# Patient Record
Sex: FEMALE | Race: WHITE | NOT HISPANIC OR LATINO | Employment: OTHER | URBAN - METROPOLITAN AREA
[De-identification: names, ages, dates, MRNs, and addresses within clinical notes are randomized per-mention and may not be internally consistent; named-entity substitution may affect disease eponyms.]

---

## 2017-04-21 ENCOUNTER — OFFICE VISIT - HEALTHEAST (OUTPATIENT)
Dept: FAMILY MEDICINE | Facility: CLINIC | Age: 62
End: 2017-04-21

## 2017-04-21 DIAGNOSIS — F51.01 PRIMARY INSOMNIA: ICD-10-CM

## 2017-04-21 DIAGNOSIS — E78.5 HYPERLIPIDEMIA, UNSPECIFIED HYPERLIPIDEMIA TYPE: ICD-10-CM

## 2017-04-21 DIAGNOSIS — E03.9 HYPOTHYROIDISM, UNSPECIFIED TYPE: ICD-10-CM

## 2017-04-21 DIAGNOSIS — A60.04 HERPES SIMPLEX VULVOVAGINITIS: ICD-10-CM

## 2017-04-21 DIAGNOSIS — Z00.00 ROUTINE GENERAL MEDICAL EXAMINATION AT A HEALTH CARE FACILITY: ICD-10-CM

## 2017-04-21 DIAGNOSIS — M94.9 DISORDER OF BONE AND CARTILAGE: ICD-10-CM

## 2017-04-21 DIAGNOSIS — Z12.11 SCREENING FOR COLON CANCER: ICD-10-CM

## 2017-04-21 DIAGNOSIS — Z12.31 SCREENING MAMMOGRAM, ENCOUNTER FOR: ICD-10-CM

## 2017-04-21 DIAGNOSIS — M89.9 DISORDER OF BONE AND CARTILAGE: ICD-10-CM

## 2017-04-21 LAB
CHOLEST SERPL-MCNC: 266 MG/DL
FASTING STATUS PATIENT QL REPORTED: YES
HDLC SERPL-MCNC: 71 MG/DL
LDLC SERPL CALC-MCNC: 177 MG/DL
TRIGL SERPL-MCNC: 91 MG/DL

## 2017-04-21 ASSESSMENT — MIFFLIN-ST. JEOR: SCORE: 1003.76

## 2017-04-22 ENCOUNTER — COMMUNICATION - HEALTHEAST (OUTPATIENT)
Dept: FAMILY MEDICINE | Facility: CLINIC | Age: 62
End: 2017-04-22

## 2017-04-26 ENCOUNTER — COMMUNICATION - HEALTHEAST (OUTPATIENT)
Dept: FAMILY MEDICINE | Facility: CLINIC | Age: 62
End: 2017-04-26

## 2017-05-15 ENCOUNTER — AMBULATORY - HEALTHEAST (OUTPATIENT)
Dept: LAB | Facility: CLINIC | Age: 62
End: 2017-05-15

## 2017-05-15 DIAGNOSIS — Z12.11 COLON CANCER SCREENING: ICD-10-CM

## 2017-05-24 ENCOUNTER — HOSPITAL ENCOUNTER (OUTPATIENT)
Dept: MAMMOGRAPHY | Facility: HOSPITAL | Age: 62
Discharge: HOME | End: 2017-05-24
Attending: FAMILY MEDICINE

## 2017-05-24 DIAGNOSIS — Z00.00 ROUTINE GENERAL MEDICAL EXAMINATION AT A HEALTH CARE FACILITY: ICD-10-CM

## 2017-05-24 DIAGNOSIS — Z12.31 SCREENING MAMMOGRAM, ENCOUNTER FOR: ICD-10-CM

## 2017-06-01 ENCOUNTER — AMBULATORY - HEALTHEAST (OUTPATIENT)
Dept: MAMMOGRAPHY | Facility: HOSPITAL | Age: 62
End: 2017-06-01

## 2017-06-24 ENCOUNTER — HEALTH MAINTENANCE LETTER (OUTPATIENT)
Age: 62
End: 2017-06-24

## 2017-07-28 ENCOUNTER — COMMUNICATION - HEALTHEAST (OUTPATIENT)
Dept: FAMILY MEDICINE | Facility: CLINIC | Age: 62
End: 2017-07-28

## 2017-08-21 ENCOUNTER — COMMUNICATION - HEALTHEAST (OUTPATIENT)
Dept: FAMILY MEDICINE | Facility: CLINIC | Age: 62
End: 2017-08-21

## 2017-08-21 ENCOUNTER — AMBULATORY - HEALTHEAST (OUTPATIENT)
Dept: LAB | Facility: CLINIC | Age: 62
End: 2017-08-21

## 2017-08-21 DIAGNOSIS — R53.83 FATIGUE, UNSPECIFIED TYPE: ICD-10-CM

## 2017-08-21 DIAGNOSIS — E03.9 HYPOTHYROIDISM, UNSPECIFIED TYPE: ICD-10-CM

## 2017-10-11 ENCOUNTER — AMBULATORY - HEALTHEAST (OUTPATIENT)
Dept: NURSING | Facility: CLINIC | Age: 62
End: 2017-10-11

## 2017-10-11 DIAGNOSIS — Z23 NEED FOR VACCINATION: ICD-10-CM

## 2017-12-16 ENCOUNTER — RECORDS - HEALTHEAST (OUTPATIENT)
Dept: ADMINISTRATIVE | Facility: OTHER | Age: 62
End: 2017-12-16

## 2017-12-17 ENCOUNTER — RECORDS - HEALTHEAST (OUTPATIENT)
Dept: ADMINISTRATIVE | Facility: OTHER | Age: 62
End: 2017-12-17

## 2017-12-18 ENCOUNTER — COMMUNICATION - HEALTHEAST (OUTPATIENT)
Dept: FAMILY MEDICINE | Facility: CLINIC | Age: 62
End: 2017-12-18

## 2017-12-20 ENCOUNTER — OFFICE VISIT - HEALTHEAST (OUTPATIENT)
Dept: FAMILY MEDICINE | Facility: CLINIC | Age: 62
End: 2017-12-20

## 2017-12-20 DIAGNOSIS — R00.2 PALPITATIONS: ICD-10-CM

## 2017-12-20 DIAGNOSIS — R55 SYNCOPE AND COLLAPSE: ICD-10-CM

## 2017-12-20 DIAGNOSIS — E87.6 HYPOKALEMIA: ICD-10-CM

## 2017-12-20 ASSESSMENT — MIFFLIN-ST. JEOR: SCORE: 1009.65

## 2017-12-21 ENCOUNTER — COMMUNICATION - HEALTHEAST (OUTPATIENT)
Dept: FAMILY MEDICINE | Facility: CLINIC | Age: 62
End: 2017-12-21

## 2017-12-21 LAB
ATRIAL RATE - MUSE: 61 BPM
DIASTOLIC BLOOD PRESSURE - MUSE: NORMAL MMHG
INTERPRETATION ECG - MUSE: NORMAL
P AXIS - MUSE: 74 DEGREES
PR INTERVAL - MUSE: 182 MS
QRS DURATION - MUSE: 86 MS
QT - MUSE: 422 MS
QTC - MUSE: 424 MS
R AXIS - MUSE: 59 DEGREES
SYSTOLIC BLOOD PRESSURE - MUSE: NORMAL MMHG
T AXIS - MUSE: 41 DEGREES
VENTRICULAR RATE- MUSE: 61 BPM

## 2017-12-26 ENCOUNTER — COMMUNICATION - HEALTHEAST (OUTPATIENT)
Dept: FAMILY MEDICINE | Facility: CLINIC | Age: 62
End: 2017-12-26

## 2017-12-26 DIAGNOSIS — L90.0 LICHEN SCLEROSUS ET ATROPHICUS: ICD-10-CM

## 2017-12-27 ENCOUNTER — COMMUNICATION - HEALTHEAST (OUTPATIENT)
Dept: FAMILY MEDICINE | Facility: CLINIC | Age: 62
End: 2017-12-27

## 2017-12-27 DIAGNOSIS — E03.9 HYPOTHYROID: ICD-10-CM

## 2017-12-28 ENCOUNTER — HOSPITAL ENCOUNTER (OUTPATIENT)
Dept: CARDIOLOGY | Facility: HOSPITAL | Age: 62
Discharge: HOME | End: 2017-12-28
Attending: FAMILY MEDICINE

## 2017-12-28 DIAGNOSIS — R55 SYNCOPE AND COLLAPSE: ICD-10-CM

## 2017-12-28 DIAGNOSIS — R00.2 PALPITATIONS: ICD-10-CM

## 2017-12-28 LAB
AORTIC ROOT: 2.5 CM
BSA FOR ECHO PROCEDURE: 1.46 M2
CV BLOOD PRESSURE: NORMAL MMHG
CV ECHO HEIGHT: 65.5 IN
CV ECHO WEIGHT: 102 LBS
DOP CALC LVOT AREA: 2.54 CM2
DOP CALC LVOT DIAMETER: 1.8 CM
DOP CALC LVOT PEAK VEL: 78.3 CM/S
DOP CALC LVOT STROKE VOLUME: 39.7 CM3
DOP CALC MV VTI: 28.7 CM
DOP CALCLVOT PEAK VEL VTI: 15.6 CM
EJECTION FRACTION: 61 % (ref 55–75)
FRACTIONAL SHORTENING: 38.1 % (ref 28–44)
INTERVENTRICULAR SEPTUM IN END DIASTOLE: 0.65 CM (ref 0.6–0.9)
IVS/PW RATIO: 1.1
LA AREA 1: 13 CM2
LA AREA 2: 13 CM2
LEFT ATRIUM LENGTH: 4.1 CM
LEFT ATRIUM SIZE: 2.6 CM
LEFT ATRIUM TO AORTIC ROOT RATIO: 1.04 NO UNITS
LEFT ATRIUM VOLUME INDEX: 24 ML/M2
LEFT ATRIUM VOLUME: 35 CM3
LEFT VENTRICLE CARDIAC INDEX: 1.5 L/MIN/M2
LEFT VENTRICLE CARDIAC OUTPUT: 2.3 L/MIN
LEFT VENTRICLE DIASTOLIC VOLUME INDEX: 35.9 CM3/M2 (ref 34–74)
LEFT VENTRICLE DIASTOLIC VOLUME: 52.4 CM3 (ref 46–106)
LEFT VENTRICLE HEART RATE: 57 BPM
LEFT VENTRICLE MASS INDEX: 52.1 G/M2
LEFT VENTRICLE SYSTOLIC VOLUME INDEX: 14 CM3/M2 (ref 11–31)
LEFT VENTRICLE SYSTOLIC VOLUME: 20.4 CM3 (ref 14–42)
LEFT VENTRICULAR INTERNAL DIMENSION IN DIASTOLE: 4.31 CM (ref 3.8–5.2)
LEFT VENTRICULAR INTERNAL DIMENSION IN SYSTOLE: 2.67 CM (ref 2.2–3.5)
LEFT VENTRICULAR MASS: 76.1 G
LEFT VENTRICULAR OUTFLOW TRACT MEAN GRADIENT: 1 MMHG
LEFT VENTRICULAR OUTFLOW TRACT MEAN VELOCITY: 52 CM/S
LEFT VENTRICULAR OUTFLOW TRACT PEAK GRADIENT: 2 MMHG
LEFT VENTRICULAR POSTERIOR WALL IN END DIASTOLE: 0.59 CM (ref 0.6–0.9)
LV STROKE VOLUME INDEX: 27.2 ML/M2
MITRAL VALVE DECELERATION SLOPE: 2340 MM/S2
MITRAL VALVE E/A RATIO: 1.6
MITRAL VALVE MEAN INFLOW VELOCITY: 45.9 CM/S
MITRAL VALVE PEAK VELOCITY: 87.3 CM/S
MITRAL VALVE PRESSURE HALF-TIME: 83 MS
MV AREA VTI: 1.38 CM2
MV AVERAGE E/E' RATIO: 9 CM/S
MV E'TISSUE VEL-LAT: 8.61 CM/S
MV E'TISSUE VEL-MED: 7.25 CM/S
MV LATERAL E/E' RATIO: 8.3
MV MEAN GRADIENT: 1 MMHG
MV MEDIAL E/E' RATIO: 9.8
MV PEAK A VELOCITY: 45.1 CM/S
MV PEAK E VELOCITY: 71.3 CM/S
MV PEAK GRADIENT: 3 MMHG
MV VALVE AREA BY CONTINUITY EQUATION: 1.4 CM2
MV VALVE AREA PRESSURE 1/2 METHOD: 2.7 CM2
NUC REST DIASTOLIC VOLUME INDEX: 1632 LBS
NUC REST SYSTOLIC VOLUME INDEX: 65.5 IN
PR MAX PG: 5 MMHG
PR PEAK VELOCITY: 115 CM/S
TRICUSPID REGURGITATION PEAK PRESSURE GRADIENT: 11.2 MMHG
TRICUSPID VALVE PEAK REGURGITANT VELOCITY: 167 CM/S

## 2017-12-28 ASSESSMENT — MIFFLIN-ST. JEOR: SCORE: 1006.48

## 2017-12-29 ENCOUNTER — COMMUNICATION - HEALTHEAST (OUTPATIENT)
Dept: FAMILY MEDICINE | Facility: CLINIC | Age: 62
End: 2017-12-29

## 2017-12-29 DIAGNOSIS — E87.6 HYPOKALEMIA: ICD-10-CM

## 2018-01-02 ENCOUNTER — AMBULATORY - HEALTHEAST (OUTPATIENT)
Dept: LAB | Facility: CLINIC | Age: 63
End: 2018-01-02

## 2018-01-02 DIAGNOSIS — E87.6 HYPOKALEMIA: ICD-10-CM

## 2018-01-02 LAB
ANION GAP SERPL CALCULATED.3IONS-SCNC: 9 MMOL/L (ref 5–18)
BUN SERPL-MCNC: 17 MG/DL (ref 8–22)
CALCIUM SERPL-MCNC: 9.9 MG/DL (ref 8.5–10.5)
CHLORIDE BLD-SCNC: 100 MMOL/L (ref 98–107)
CO2 SERPL-SCNC: 30 MMOL/L (ref 22–31)
CREAT SERPL-MCNC: 0.75 MG/DL (ref 0.6–1.1)
GFR SERPL CREATININE-BSD FRML MDRD: >60 ML/MIN/1.73M2
GLUCOSE BLD-MCNC: 78 MG/DL (ref 70–125)
POTASSIUM BLD-SCNC: 4 MMOL/L (ref 3.5–5)
SODIUM SERPL-SCNC: 139 MMOL/L (ref 136–145)

## 2018-01-08 ENCOUNTER — OFFICE VISIT - HEALTHEAST (OUTPATIENT)
Dept: CARDIOLOGY | Facility: CLINIC | Age: 63
End: 2018-01-08

## 2018-01-08 DIAGNOSIS — R55 SYNCOPE: ICD-10-CM

## 2018-01-08 DIAGNOSIS — S06.0XAA CONCUSSION: ICD-10-CM

## 2018-01-08 ASSESSMENT — MIFFLIN-ST. JEOR: SCORE: 1020.09

## 2018-01-18 ENCOUNTER — HOSPITAL ENCOUNTER (OUTPATIENT)
Dept: CARDIOLOGY | Facility: HOSPITAL | Age: 63
Discharge: HOME | End: 2018-01-18
Attending: INTERNAL MEDICINE

## 2018-01-23 ENCOUNTER — COMMUNICATION - HEALTHEAST (OUTPATIENT)
Dept: FAMILY MEDICINE | Facility: CLINIC | Age: 63
End: 2018-01-23

## 2018-01-26 ENCOUNTER — COMMUNICATION - HEALTHEAST (OUTPATIENT)
Dept: FAMILY MEDICINE | Facility: CLINIC | Age: 63
End: 2018-01-26

## 2018-01-26 DIAGNOSIS — E03.9 HYPOTHYROID: ICD-10-CM

## 2018-03-08 ENCOUNTER — COMMUNICATION - HEALTHEAST (OUTPATIENT)
Dept: FAMILY MEDICINE | Facility: CLINIC | Age: 63
End: 2018-03-08

## 2018-03-26 ENCOUNTER — AMBULATORY - HEALTHEAST (OUTPATIENT)
Dept: LAB | Facility: CLINIC | Age: 63
End: 2018-03-26

## 2018-03-26 DIAGNOSIS — E03.9 HYPOTHYROIDISM, UNSPECIFIED TYPE: ICD-10-CM

## 2018-03-26 LAB — TSH SERPL DL<=0.005 MIU/L-ACNC: 0.57 UIU/ML (ref 0.3–5)

## 2018-06-04 ENCOUNTER — AMBULATORY - HEALTHEAST (OUTPATIENT)
Dept: SCHEDULING | Facility: CLINIC | Age: 63
End: 2018-06-04

## 2018-06-04 ENCOUNTER — OFFICE VISIT - HEALTHEAST (OUTPATIENT)
Dept: FAMILY MEDICINE | Facility: CLINIC | Age: 63
End: 2018-06-04

## 2018-06-04 DIAGNOSIS — M89.9 DISORDER OF BONE AND CARTILAGE: ICD-10-CM

## 2018-06-04 DIAGNOSIS — E78.5 HYPERLIPIDEMIA, UNSPECIFIED HYPERLIPIDEMIA TYPE: ICD-10-CM

## 2018-06-04 DIAGNOSIS — Z13.1 SCREENING FOR DIABETES MELLITUS: ICD-10-CM

## 2018-06-04 DIAGNOSIS — M94.9 DISORDER OF BONE AND CARTILAGE: ICD-10-CM

## 2018-06-04 DIAGNOSIS — Z12.4 SCREENING FOR CERVICAL CANCER: ICD-10-CM

## 2018-06-04 DIAGNOSIS — Z12.11 SCREEN FOR COLON CANCER: ICD-10-CM

## 2018-06-04 DIAGNOSIS — E55.9 VITAMIN D DEFICIENCY: ICD-10-CM

## 2018-06-04 DIAGNOSIS — E03.9 HYPOTHYROIDISM, UNSPECIFIED TYPE: ICD-10-CM

## 2018-06-04 DIAGNOSIS — Z00.00 ROUTINE GENERAL MEDICAL EXAMINATION AT A HEALTH CARE FACILITY: ICD-10-CM

## 2018-06-04 LAB
CHOLEST SERPL-MCNC: 267 MG/DL
FASTING STATUS PATIENT QL REPORTED: YES
FASTING STATUS PATIENT QL REPORTED: YES
GLUCOSE BLD-MCNC: 80 MG/DL (ref 70–125)
HDLC SERPL-MCNC: 72 MG/DL
LDLC SERPL CALC-MCNC: 179 MG/DL
T3 SERPL-MCNC: 77 NG/DL (ref 45–175)
T4 FREE SERPL-MCNC: 1.2 NG/DL (ref 0.7–1.8)
TRIGL SERPL-MCNC: 81 MG/DL
TSH SERPL DL<=0.005 MIU/L-ACNC: 0.54 UIU/ML (ref 0.3–5)

## 2018-06-04 ASSESSMENT — MIFFLIN-ST. JEOR: SCORE: 1021.23

## 2018-06-05 ENCOUNTER — COMMUNICATION - HEALTHEAST (OUTPATIENT)
Dept: FAMILY MEDICINE | Facility: CLINIC | Age: 63
End: 2018-06-05

## 2018-06-05 LAB
25(OH)D3 SERPL-MCNC: 69.9 NG/ML (ref 30–80)
25(OH)D3 SERPL-MCNC: 69.9 NG/ML (ref 30–80)

## 2018-06-06 LAB
HPV SOURCE: ABNORMAL
HUMAN PAPILLOMA VIRUS 16 DNA: NEGATIVE
HUMAN PAPILLOMA VIRUS 18 DNA: NEGATIVE
HUMAN PAPILLOMA VIRUS FINAL DIAGNOSIS: ABNORMAL
HUMAN PAPILLOMA VIRUS OTHER HR: POSITIVE
SPECIMEN DESCRIPTION: ABNORMAL

## 2018-06-07 ENCOUNTER — RECORDS - HEALTHEAST (OUTPATIENT)
Dept: ADMINISTRATIVE | Facility: OTHER | Age: 63
End: 2018-06-07

## 2018-06-08 ENCOUNTER — COMMUNICATION - HEALTHEAST (OUTPATIENT)
Dept: FAMILY MEDICINE | Facility: CLINIC | Age: 63
End: 2018-06-08

## 2018-06-13 ENCOUNTER — OFFICE VISIT - HEALTHEAST (OUTPATIENT)
Dept: AUDIOLOGY | Facility: CLINIC | Age: 63
End: 2018-06-13

## 2018-06-13 ENCOUNTER — OFFICE VISIT - HEALTHEAST (OUTPATIENT)
Dept: OTOLARYNGOLOGY | Facility: CLINIC | Age: 63
End: 2018-06-13

## 2018-06-13 DIAGNOSIS — H92.01 OTALGIA, RIGHT: ICD-10-CM

## 2018-06-13 DIAGNOSIS — H92.01 EAR PAIN, RIGHT: ICD-10-CM

## 2018-06-13 DIAGNOSIS — H90.41 SENSORINEURAL HEARING LOSS (SNHL) OF RIGHT EAR WITH UNRESTRICTED HEARING OF LEFT EAR: ICD-10-CM

## 2018-06-14 LAB
HEMOCCULT SP1 STL QL: NEGATIVE
HEMOCCULT SP2 STL QL: NEGATIVE
HEMOCCULT SP3 STL QL: NEGATIVE

## 2018-07-25 ENCOUNTER — AMBULATORY - HEALTHEAST (OUTPATIENT)
Dept: LAB | Facility: CLINIC | Age: 63
End: 2018-07-25

## 2018-07-25 ENCOUNTER — AMBULATORY - HEALTHEAST (OUTPATIENT)
Dept: FAMILY MEDICINE | Facility: CLINIC | Age: 63
End: 2018-07-25

## 2018-07-25 DIAGNOSIS — E03.9 HYPOTHYROIDISM, UNSPECIFIED TYPE: ICD-10-CM

## 2018-07-25 LAB — TSH SERPL DL<=0.005 MIU/L-ACNC: 1.64 UIU/ML (ref 0.3–5)

## 2018-07-29 ENCOUNTER — COMMUNICATION - HEALTHEAST (OUTPATIENT)
Dept: FAMILY MEDICINE | Facility: CLINIC | Age: 63
End: 2018-07-29

## 2018-09-12 ENCOUNTER — COMMUNICATION - HEALTHEAST (OUTPATIENT)
Dept: FAMILY MEDICINE | Facility: CLINIC | Age: 63
End: 2018-09-12

## 2018-09-13 ENCOUNTER — OFFICE VISIT - HEALTHEAST (OUTPATIENT)
Dept: FAMILY MEDICINE | Facility: CLINIC | Age: 63
End: 2018-09-13

## 2018-09-13 DIAGNOSIS — Z23 NEED FOR VACCINATION: ICD-10-CM

## 2018-09-13 DIAGNOSIS — H01.006 BLEPHARITIS OF BOTH EYES: ICD-10-CM

## 2018-09-13 DIAGNOSIS — H00.019 HORDEOLUM EXTERNUM (STYE): ICD-10-CM

## 2018-09-13 DIAGNOSIS — H01.003 BLEPHARITIS OF BOTH EYES: ICD-10-CM

## 2018-09-13 DIAGNOSIS — M77.11 RIGHT TENNIS ELBOW: ICD-10-CM

## 2018-09-13 ASSESSMENT — MIFFLIN-ST. JEOR: SCORE: 1028.03

## 2018-09-14 ENCOUNTER — RECORDS - HEALTHEAST (OUTPATIENT)
Dept: ADMINISTRATIVE | Facility: OTHER | Age: 63
End: 2018-09-14

## 2018-09-26 ENCOUNTER — COMMUNICATION - HEALTHEAST (OUTPATIENT)
Dept: FAMILY MEDICINE | Facility: CLINIC | Age: 63
End: 2018-09-26

## 2018-09-26 DIAGNOSIS — E03.9 HYPOTHYROID: ICD-10-CM

## 2018-10-23 ENCOUNTER — COMMUNICATION - HEALTHEAST (OUTPATIENT)
Dept: FAMILY MEDICINE | Facility: CLINIC | Age: 63
End: 2018-10-23

## 2018-11-05 ENCOUNTER — AMBULATORY - HEALTHEAST (OUTPATIENT)
Dept: FAMILY MEDICINE | Facility: CLINIC | Age: 63
End: 2018-11-05

## 2018-11-05 DIAGNOSIS — Z12.11 COLON CANCER SCREENING: ICD-10-CM

## 2018-11-12 ENCOUNTER — AMBULATORY - HEALTHEAST (OUTPATIENT)
Dept: LAB | Facility: CLINIC | Age: 63
End: 2018-11-12

## 2018-11-12 DIAGNOSIS — Z12.11 COLON CANCER SCREENING: ICD-10-CM

## 2018-11-12 LAB
HEMOCCULT SP1 STL QL: NEGATIVE
HEMOCCULT SP2 STL QL: NEGATIVE
HEMOCCULT SP3 STL QL: NEGATIVE

## 2019-01-07 ENCOUNTER — COMMUNICATION - HEALTHEAST (OUTPATIENT)
Dept: FAMILY MEDICINE | Facility: CLINIC | Age: 64
End: 2019-01-07

## 2019-01-07 DIAGNOSIS — E03.9 HYPOTHYROIDISM, UNSPECIFIED TYPE: ICD-10-CM

## 2019-01-09 ENCOUNTER — AMBULATORY - HEALTHEAST (OUTPATIENT)
Dept: LAB | Facility: CLINIC | Age: 64
End: 2019-01-09

## 2019-01-09 DIAGNOSIS — E03.9 HYPOTHYROIDISM, UNSPECIFIED TYPE: ICD-10-CM

## 2019-01-09 LAB — TSH SERPL DL<=0.005 MIU/L-ACNC: 0.51 UIU/ML (ref 0.3–5)

## 2019-06-05 ENCOUNTER — COMMUNICATION - HEALTHEAST (OUTPATIENT)
Dept: FAMILY MEDICINE | Facility: CLINIC | Age: 64
End: 2019-06-05

## 2019-06-05 DIAGNOSIS — L94.0 CIRCUMSCRIBED SCLERODERMA: ICD-10-CM

## 2019-06-21 ENCOUNTER — COMMUNICATION - HEALTHEAST (OUTPATIENT)
Dept: FAMILY MEDICINE | Facility: CLINIC | Age: 64
End: 2019-06-21

## 2019-06-21 ENCOUNTER — OFFICE VISIT - HEALTHEAST (OUTPATIENT)
Dept: FAMILY MEDICINE | Facility: CLINIC | Age: 64
End: 2019-06-21

## 2019-06-21 DIAGNOSIS — M94.9 DISORDER OF BONE AND CARTILAGE: ICD-10-CM

## 2019-06-21 DIAGNOSIS — Z12.11 SCREENING FOR COLON CANCER: ICD-10-CM

## 2019-06-21 DIAGNOSIS — R87.810 PAPANICOLAOU SMEAR OF CERVIX WITH POSITIVE HIGH RISK HUMAN PAPILLOMA VIRUS (HPV) TEST: ICD-10-CM

## 2019-06-21 DIAGNOSIS — E06.3 HYPOTHYROIDISM DUE TO HASHIMOTO'S THYROIDITIS: ICD-10-CM

## 2019-06-21 DIAGNOSIS — B00.9 HSV (HERPES SIMPLEX VIRUS) INFECTION: ICD-10-CM

## 2019-06-21 DIAGNOSIS — R53.82 CHRONIC FATIGUE: ICD-10-CM

## 2019-06-21 DIAGNOSIS — M89.9 DISORDER OF BONE AND CARTILAGE: ICD-10-CM

## 2019-06-21 DIAGNOSIS — Z12.4 ENCOUNTER FOR SCREENING FOR MALIGNANT NEOPLASM OF CERVIX: ICD-10-CM

## 2019-06-21 DIAGNOSIS — E78.5 HYPERLIPIDEMIA, UNSPECIFIED HYPERLIPIDEMIA TYPE: ICD-10-CM

## 2019-06-21 DIAGNOSIS — Z13.1 SCREENING FOR DIABETES MELLITUS: ICD-10-CM

## 2019-06-21 DIAGNOSIS — Z00.00 ROUTINE GENERAL MEDICAL EXAMINATION AT A HEALTH CARE FACILITY: ICD-10-CM

## 2019-06-21 LAB
CHOLEST SERPL-MCNC: 261 MG/DL
FASTING STATUS PATIENT QL REPORTED: YES
FASTING STATUS PATIENT QL REPORTED: YES
GLUCOSE BLD-MCNC: 73 MG/DL (ref 70–99)
HDLC SERPL-MCNC: 80 MG/DL
LDLC SERPL CALC-MCNC: 163 MG/DL
TRIGL SERPL-MCNC: 91 MG/DL
TSH SERPL DL<=0.005 MIU/L-ACNC: 4.75 UIU/ML (ref 0.3–5)

## 2019-06-21 ASSESSMENT — MIFFLIN-ST. JEOR: SCORE: 1017.37

## 2019-06-24 LAB
B BURGDOR IGG+IGM SER QL: 0.06 INDEX VALUE
HPV SOURCE: NORMAL
HUMAN PAPILLOMA VIRUS 16 DNA: NEGATIVE
HUMAN PAPILLOMA VIRUS 18 DNA: NEGATIVE
HUMAN PAPILLOMA VIRUS FINAL DIAGNOSIS: NORMAL
HUMAN PAPILLOMA VIRUS OTHER HR: NEGATIVE
SPECIMEN DESCRIPTION: NORMAL

## 2019-06-25 ENCOUNTER — RECORDS - HEALTHEAST (OUTPATIENT)
Dept: ADMINISTRATIVE | Facility: OTHER | Age: 64
End: 2019-06-25

## 2019-07-10 ENCOUNTER — RECORDS - HEALTHEAST (OUTPATIENT)
Dept: ADMINISTRATIVE | Facility: OTHER | Age: 64
End: 2019-07-10

## 2019-07-10 LAB — COLOGUARD-ABSTRACT: NEGATIVE

## 2019-07-30 ENCOUNTER — RECORDS - HEALTHEAST (OUTPATIENT)
Dept: HEALTH INFORMATION MANAGEMENT | Facility: CLINIC | Age: 64
End: 2019-07-30

## 2019-08-08 ENCOUNTER — COMMUNICATION - HEALTHEAST (OUTPATIENT)
Dept: FAMILY MEDICINE | Facility: CLINIC | Age: 64
End: 2019-08-08

## 2019-08-09 ENCOUNTER — AMBULATORY - HEALTHEAST (OUTPATIENT)
Dept: LAB | Facility: CLINIC | Age: 64
End: 2019-08-09

## 2019-08-09 ENCOUNTER — COMMUNICATION - HEALTHEAST (OUTPATIENT)
Dept: FAMILY MEDICINE | Facility: CLINIC | Age: 64
End: 2019-08-09

## 2019-08-09 DIAGNOSIS — E03.9 HYPOTHYROID: ICD-10-CM

## 2019-08-09 DIAGNOSIS — E06.3 HYPOTHYROIDISM DUE TO HASHIMOTO'S THYROIDITIS: ICD-10-CM

## 2019-08-09 LAB — TSH SERPL DL<=0.005 MIU/L-ACNC: 0.27 UIU/ML (ref 0.3–5)

## 2019-08-12 ENCOUNTER — TELEPHONE (OUTPATIENT)
Dept: ENDOCRINOLOGY | Facility: CLINIC | Age: 64
End: 2019-08-12

## 2019-08-12 NOTE — TELEPHONE ENCOUNTER
To schedulers: Please schedule  for lst available endocrine    Radha Quiroz MD  Endocrine triage      [facility] Call Center    Phone Message    May a detailed message be left on voicemail: yes    Reason for Call: Patient is being referred to Endocrine from Sh[facility]. Records are available in care everywhere.  Please review records and contact patient with appointment options     Action Taken: Message routed to:  [facility] ([facility]): Endocrine

## 2019-08-17 NOTE — TELEPHONE ENCOUNTER
RECORDS RECEIVED FROM: Internal   DATE RECEIVED: 10.17.19   NOTES (FOR ALL VISITS) STATUS DETAILS   OFFICE NOTES from referring provider Internal 8.9.19 Shital Peterson, telephone encounter   OFFICE NOTES from other specialist N/A    ED NOTES N/A    OPERATIVE REPORT  (thyroid, pituitary, adrenal, parathyroid) N/A    MEDICATION LIST Internal    IMAGING      DEXASCAN N/A    MRI (BRAIN) N/A    XR (Chest) N/A    CT (HEAD/NECK/CHEST/ABDOMEN) N/A    NUCLEAR  N/A    ULTRASOUND (HEAD/NECK) N/A    LABS     DIABETES: HBGA1C, CREATININE, FASTING LIPIDS, MICROALBUMIN URINE, POTASSIUM, TSH, T4    THYROID: TSH, T4, CBC, THYRODLONULIN, TOTAL T3, FREE T4, CALCITONIN, CEA Internal   8.9.19

## 2019-09-19 ENCOUNTER — AMBULATORY - HEALTHEAST (OUTPATIENT)
Dept: NURSING | Facility: CLINIC | Age: 64
End: 2019-09-19

## 2019-09-19 ENCOUNTER — COMMUNICATION - HEALTHEAST (OUTPATIENT)
Dept: FAMILY MEDICINE | Facility: CLINIC | Age: 64
End: 2019-09-19

## 2019-09-19 DIAGNOSIS — Z23 FLU VACCINE NEED: ICD-10-CM

## 2019-09-30 ENCOUNTER — DOCUMENTATION ONLY (OUTPATIENT)
Dept: CARE COORDINATION | Facility: CLINIC | Age: 64
End: 2019-09-30

## 2019-10-02 ENCOUNTER — HEALTH MAINTENANCE LETTER (OUTPATIENT)
Age: 64
End: 2019-10-02

## 2019-10-03 ASSESSMENT — ENCOUNTER SYMPTOMS
JOINT SWELLING: 0
VOMITING: 0
WEIGHT GAIN: 0
POOR WOUND HEALING: 0
SORE THROAT: 0
NAIL CHANGES: 0
SMELL DISTURBANCE: 0
BLOATING: 0
BLOOD IN STOOL: 0
DIFFICULTY URINATING: 0
SKIN CHANGES: 0
NECK MASS: 0
ABDOMINAL PAIN: 0
PANIC: 0
DECREASED CONCENTRATION: 1
ALTERED TEMPERATURE REGULATION: 1
TROUBLE SWALLOWING: 0
POLYPHAGIA: 0
EYE REDNESS: 1
SINUS CONGESTION: 0
CHILLS: 1
MUSCLE CRAMPS: 1
NAUSEA: 0
HALLUCINATIONS: 0
SINUS PAIN: 0
FATIGUE: 1
ARTHRALGIAS: 1
WEIGHT LOSS: 0
STIFFNESS: 1
DIARRHEA: 1
BACK PAIN: 0
DYSURIA: 0
CONSTIPATION: 1
INSOMNIA: 1
POLYDIPSIA: 0
MUSCLE WEAKNESS: 0
HOT FLASHES: 1
EYE PAIN: 0
HOARSE VOICE: 0
NECK PAIN: 1
FEVER: 0
DOUBLE VISION: 0
HEMATURIA: 0
DECREASED APPETITE: 1
FLANK PAIN: 0
NERVOUS/ANXIOUS: 1
MYALGIAS: 1
RECTAL PAIN: 0
DEPRESSION: 1
JAUNDICE: 0
DECREASED LIBIDO: 0
HEARTBURN: 0
EYE WATERING: 0
TASTE DISTURBANCE: 0
INCREASED ENERGY: 1
BOWEL INCONTINENCE: 0
NIGHT SWEATS: 0
EYE IRRITATION: 1

## 2019-10-17 ENCOUNTER — PRE VISIT (OUTPATIENT)
Dept: ENDOCRINOLOGY | Facility: CLINIC | Age: 64
End: 2019-10-17

## 2019-10-17 ENCOUNTER — OFFICE VISIT (OUTPATIENT)
Dept: ENDOCRINOLOGY | Facility: CLINIC | Age: 64
End: 2019-10-17
Payer: COMMERCIAL

## 2019-10-17 VITALS
DIASTOLIC BLOOD PRESSURE: 77 MMHG | HEIGHT: 66 IN | BODY MASS INDEX: 17.28 KG/M2 | WEIGHT: 107.5 LBS | HEART RATE: 73 BPM | SYSTOLIC BLOOD PRESSURE: 111 MMHG

## 2019-10-17 DIAGNOSIS — M85.88 OSTEOPENIA OF LUMBAR SPINE: ICD-10-CM

## 2019-10-17 DIAGNOSIS — M85.80 BONE LOSS: ICD-10-CM

## 2019-10-17 DIAGNOSIS — E06.3 HYPOTHYROIDISM DUE TO HASHIMOTO'S THYROIDITIS: ICD-10-CM

## 2019-10-17 DIAGNOSIS — E06.3 HYPOTHYROIDISM DUE TO HASHIMOTO'S THYROIDITIS: Primary | ICD-10-CM

## 2019-10-17 LAB
DEPRECATED CALCIDIOL+CALCIFEROL SERPL-MC: 49 UG/L (ref 20–75)
T4 FREE SERPL-MCNC: 1.1 NG/DL (ref 0.76–1.46)
TSH SERPL DL<=0.005 MIU/L-ACNC: 0.75 MU/L (ref 0.4–4)

## 2019-10-17 RX ORDER — ZOLEDRONIC ACID 5 MG/100ML
5 INJECTION, SOLUTION INTRAVENOUS ONCE
Status: CANCELLED
Start: 2019-10-17

## 2019-10-17 RX ORDER — HEPARIN SODIUM,PORCINE 10 UNIT/ML
5 VIAL (ML) INTRAVENOUS
Status: CANCELLED | OUTPATIENT
Start: 2019-10-17

## 2019-10-17 RX ORDER — LEVOTHYROXINE SODIUM 75 UG/1
75 CAPSULE ORAL DAILY
Qty: 90 CAPSULE | Refills: 3 | Status: SHIPPED | OUTPATIENT
Start: 2019-10-17 | End: 2019-10-17

## 2019-10-17 RX ORDER — HEPARIN SODIUM (PORCINE) LOCK FLUSH IV SOLN 100 UNIT/ML 100 UNIT/ML
5 SOLUTION INTRAVENOUS
Status: CANCELLED | OUTPATIENT
Start: 2019-10-17

## 2019-10-17 RX ORDER — LEVOTHYROXINE SODIUM 75 UG/1
75 CAPSULE ORAL DAILY
Qty: 30 CAPSULE | Refills: 3 | Status: SHIPPED | OUTPATIENT
Start: 2019-10-17 | End: 2022-05-01

## 2019-10-17 ASSESSMENT — PAIN SCALES - GENERAL: PAINLEVEL: NO PAIN (0)

## 2019-10-17 ASSESSMENT — MIFFLIN-ST. JEOR: SCORE: 1044.37

## 2019-10-17 NOTE — LETTER
10/17/2019       RE: Dorcas Roman  [address]  [facility] [address]     Dear Colleague,    Thank you for referring your patient, Dorcas Roman, to the [facility] ENDOCRINOLOGY at [facility] acute [facility]. Please see a copy of my visit note below.    Endocrinology Clinic- New visit  Date: October 16, 2019    Chief complaint:  Dorcas is a 64 year old female seen in consultation at the request of Priya Alan MD  at [facility] [place]  I have reviewed data from Care Everywhere: data from [facility], [facility], [facility], [facility] and [facility] reviewed.     ASSESSMENT/PLAN  #1 Hypothyroidism secondary to Hashimoto thyroiditis  Patient with long-standing hypothyroidism with fluctuating TSH in the past, presented here to help with getting to constant TSH level.  She could be a good candidate for brand-name levothyroxine to ensure constant GI absorption as she appears to have TSH level that is sensitive to minor changes in levothyroxine dose.  However it is possible that the symptoms she are experiencing are not related to thyroid dysfunction and the TSH variation is within physiologic range.  Currently she is taking levothyroxine 75 mcg 6 day/week.  For past 2 weeks she reported to be clinically euthyroid.     -We will check TSH and free T4 this visit and adjust levothyroxine dose accordingly  -Prescribed her Tirosint same dose with her current levothyroxine.  We will check the price of the medication after insurance coverage, will let her know the price and let her decide if she prefers this.    #2 Osteopenia  Regarding her osteopenia she had Fosamax p.o. for 5 years in the remote past, per her last use was around 2009.  Her bone scan after Fosamax showed no deterioration back then(successful Fosamax treatment)  but recent DEXA suggest ongoing bone loss.  Patient is agreeable to start Reclast.  Recent benefit explained, and she does not have plan for dental works in the future.        -Schedule Reclast infusion(first dose).  Next dose will be in 2 years after this.   -From diet encouraged increasing vitamin D and calcium.  Will recheck vitamin D this visit    I have seen and examined the patient and agree with the fellow's plan of care as noted.  October 17, 2019    Dr. Kelsey Hendricks [phone]    HISTORY OF PRESENT ILLNESS  Dorcas is a 64 year old female with history of Hashimoto's thyroiditis, osteopenia, breast cancer 2001 in remission status post chemoradiation surgery, 2.5 years of tamoxifen, dyslipidemia presented for evaluation of hypothyroidism.     She presented here to help with getting to constant TSH level.  She was diagnosed with hypothyroidism 18 years ago. in the past she had fluctuating TSH level with frequent changes in her levothyroxine supplementation. As shown below she was taking levothyroxine 0.75 micrograms 6 days a week, and was found to have TSH 4.75 6/2019, levothyroxine increased to 0.75 mcg daily.  Then in 8/2019 TSH found to be low 0.27 and levothyroxine switched back to 0.75 mcg 6 day/week.  Patient stated that the best TSH range for her is between 1.0-1.5.  She said that when TSH was low she had symptoms of fatigue, increased appetite, trouble sleeping and when TSH was on higher side she felt sleepy, cold, tired.  She admitted that part of her symptoms could be affected by recent EBV reactivation and also at that she only started back exercising after 8 months of rest and she could have overexerted herself.  She was taking biotin in the past but last use was a few years ago         2/12/2019 anti-TPO antibody 13.8 elevated, TSH 1.8, T4 free 1.3, T3 free 2.1 [facility]         Sx of neck compression-none  Family history-Cousin, mother, aunt hypothyrodism  Radiation to neck, Iodinated contrast, neck surgery-none  Biotin supplementation-a few years ago currently on multivitamin.  health    Wt Readings from Last 10 Encounters:   10/17/19 48.8 kg (107 lb 8 oz)    10/20/15 46.4 kg (102 lb 3.2 oz)   09/27/11 53.9 kg (118 lb 12.8 oz)     Regarding her osteopenia,  she used Fosamax in the past for 5 years last use was probably 2009.  DEXA scan post Fosamax therapy showed stable bone density. Then subsequently bone scan 6/7/2018 again showed bone loss in the lumbar spine area.  She denies history of fracture.  Her mother, aunt, sister all have osteoporosis.  She barely takes yogurt and does not drink milk or take Cheese.  Also low on green leafy vegetables.  She takes vitamin D 1000 units daily last vitamin D was 64 in 2018    REVIEW OF SYSTEMS    10 system ROS otherwise as per the HPI or negative    Past Medical History  Past Medical History:   Diagnosis Date     Abnormal Pap smear     Along time ago. Been fine since.     Breast disorder Nov 2000    Cancer     History of breast cancer 2000    ER +     HSV infection      Hypothyroid        Medications  Current Outpatient Medications   Medication     acyclovir (ZOVIRAX) 200 MG capsule     biotin 2.5 mg/mL     Cholecalciferol (VITAMIN D PO)     Clobetasol Prop Crea-Coal Tar 0.05 & 2.3 % KIT     levothyroxine (SYNTHROID) 75 MCG tablet     levothyroxine (SYNTHROID) 88 MCG tablet     multivitamin, therapeutic with minerals (MULTI-VITAMIN) TABS     Omega-3 Fatty Acids (OMEGA 3 PO)     selenium 50 MCG TABS     No current facility-administered medications for this visit.        Current Outpatient Medications   Medication Sig Dispense Refill     acyclovir (ZOVIRAX) 200 MG capsule Take 1 capsule (200 mg) by mouth 5 times daily 25 capsule 5     biotin 2.5 mg/mL Take 1,000 mg by mouth daily       Cholecalciferol (VITAMIN D PO) Take  by mouth.       Clobetasol Prop Crea-Coal Tar 0.05 & 2.3 % KIT        levothyroxine (SYNTHROID) 75 MCG tablet Take 75 mcg by mouth daily.       levothyroxine (SYNTHROID) 88 MCG tablet        multivitamin, therapeutic with minerals (MULTI-VITAMIN) TABS Take 1 tablet by mouth daily       Omega-3 Fatty Acids  "(OMEGA 3 PO) Take  by mouth.       selenium 50 MCG TABS Take 200 mcg by mouth daily         Allergies  No Known Allergies      Family History  family history includes Anxiety Disorder in her brother and niece; Breast Cancer in her maternal grandmother and sister; Colon Cancer in her paternal grandfather; Depression in her brother and mother; Diabetes in her mother; Hypertension in her brother, father, and mother; Mental Illness in her brother, mother, and sister; Osteoporosis in her mother and sister; Other Cancer in her father and mother; Substance Abuse in her brother, father, and sister; Thyroid Disease in her cousin and mother.    Social History  Social History     Tobacco Use     Smoking status: Never Smoker   Substance Use Topics     Alcohol use: Yes     Drug use: No       Physical Exam  /77   Pulse 73   Ht 1.676 m (5' 6\")   Wt 48.8 kg (107 lb 8 oz)   BMI 17.35 kg/m     GENERAL :  In no apparent distress  SKIN: Normal color, normal temperature, texture.  No hirsutism, alopecia or purple striae.     EYES: PERRL, EOMI, No scleral icterus,  No proptosis, conjunctival redness, stare, retraction  MOUTH: Moist, pink; pharynx clear  NECK: No visible masses. No palpable adenopathy, or masses. No carotid bruits. THYROID:  Normal, nontender, smooth / firm texture,  no nodules, no Bruit   RESP: Lungs clear to auscultation bilaterally  CARDIAC: Regular rate and rhythm, normal S1 S2, without murmurs, rubs or gallops  ABDOMEN: Normal bowel sounds; soft, nontender, no HSM or masses       NEURO: awake, alert, responds appropriately to questions.  Cranial nerves intact.  Moves all extremities; Gait normal.  No tremor of the outstretched hand.  DTRs   2/4 ,   EXTREMITIES: No clubbing, cyanosis or edema.    DATA REVIEW    Result Impression   Impression: Negative for evidence of adenopathy.      Reading Radiologist: Derek uA   Result Narrative   Examination: Ultrasound soft tissues neck.    Indication: Concern " for lymphadenopathy. Pain.    Right: There is a small lymph node 6.5 mm maximal dimension zone 1. No other identified lymph nodes.    Left: No identified lymph nodes.   Other Result Information   Interface, [facility] - 04/22/2019  2:09 PM CDT  Examination: Ultrasound soft tissues neck.    Indication: Concern for lymphadenopathy. Pain.    Right: There is a small lymph node 6.5 mm maximal dimension zone 1. No other identified lymph nodes.    Left: No identified lymph nodes.    IMPRESSION  Impression: Negative for evidence of adenopathy.      Reading Radiologist: Derek Au     Result Narrative   6/7/2018      RE: Dorcas Roman  YOB: 1955        Dear Priya Alan,    Patient Profile:  62 y.o. female, postmenopausal, is here for the follow up bone density   test.   History of fractures - None. Family history of osteoporosis - Yes;    mother.  Family history of hip fracture: Yes;  mother. Smoking history -   No. Osteoporosis treatment past -  Yes;  Bisphosphonates. Osteoporosis   treatment current - No.  Chronic medical problems - Breast cancer and   Radiation treatment. High risk medications -  Chemotherapy;  Yes, in the   Past and Thyroid;  Yes, Currently.      Assessment:    1. The spine bone density L1-L2 with T-score -1.6.  2. Femoral bone densities show left total hip T- score -1.6 and right   total hip T-score -1.3.  3. Trabecular bone score indicates good trabecular bone architecture.      62 y.o. female with LOW BONE DENSITY (OSTEOPENIA) and MODERATE fracture   risk, adjusted for the TBS, with major osteoporotic fracture risk 13.0 %   and hip fracture risk 0.8 %.     There is a previous bone density that was performed on a different scanner   at [facility].  Therefore, the results are not directly   comparable.  However, since the scan performed on April 4, 2013, there has   been a 5.2% decline in the lumbar vertebrae.  Additionally, a 4.9% decline   is seen in the  left total hip along with a 5.3% decline in the right total   hip.    Recommendations:  Appropriate calcium, vitamin D supplements, along with balance and weight   bearing exercises, are recommended with follow up bone density scan in 2   years.      Bone densitometry was performed on your patient using our Cellay   densitometer. The results are summarized and a copy of the actual scans   are included for your review. In conformity with the International Society   of Clinical Densitometry's most recent position statement for DXA   interpretation (2015), the diagnosis will be made on the lowest measured   T-score of the lumbar spine, femoral neck, total proximal femur or 33%   radius. Note the change in terminology for diagnostic classification from   OSTEOPENIA to LOW BONE MASS. All trending for sequential exams will be   done using multiple vertebrae or the total proximal femur. Fracture risk   is based on the WHO Fracture Risk Assessment Tool (FRAX). If additional   information is needed or if you would like to discuss the results, please   do not hesitate to call me.       Thank you for referring this patient to [facility].   We are happy to be of service in support of you and your practice. If you   have any questions or suggestions to improve our service, please call me   at [phone].     Sincerely,     Vivian Rowley M.D. C.C.CAYLA.  Osteoporosis Services, [facility]     Lilly Lawrence   Endocrinology Fellow PGY-4  Discussed with staff endocrinologist Kelsey Toussaint

## 2019-10-17 NOTE — PATIENT INSTRUCTIONS
-will adjust your levothyroxine based on your TSH/Free T4 level today.   -Will let you know price of tirosin and let you decide if you would like to try that option.

## 2019-10-17 NOTE — PROGRESS NOTES
Endocrinology Clinic- New visit  Date: October 16, 2019    Chief complaint:  Dorcas is a 64 year old female seen in consultation at the request of Priya Alan MD  at [facility]  I have reviewed data from Care Everywhere: data from [facility], [facility], [facility], [facility] and [facility] reviewed.     ASSESSMENT/PLAN  #1 Hypothyroidism secondary to Hashimoto thyroiditis  Patient with long-standing hypothyroidism with fluctuating TSH in the past, presented here to help with getting to constant TSH level.  She could be a good candidate for brand-name levothyroxine to ensure constant GI absorption as she appears to have TSH level that is sensitive to minor changes in levothyroxine dose.  However it is possible that the symptoms she are experiencing are not related to thyroid dysfunction and the TSH variation is within physiologic range.  Currently she is taking levothyroxine 75 mcg 6 day/week.  For past 2 weeks she reported to be clinically euthyroid.     -We will check TSH and free T4 this visit and adjust levothyroxine dose accordingly  -Prescribed her Tirosint same dose with her current levothyroxine.  We will check the price of the medication after insurance coverage, will let her know the price and let her decide if she prefers this.    #2 Osteopenia  Regarding her osteopenia she had Fosamax p.o. for 5 years in the remote past, per her last use was around 2009.  Her bone scan after Fosamax showed no deterioration back then(successful Fosamax treatment)  but recent DEXA suggest ongoing bone loss.  Patient is agreeable to start Reclast.  Recent benefit explained, and she does not have plan for dental works in the future.       -Schedule Reclast infusion(first dose).  Next dose will be in 2 years after this.   -From diet encouraged increasing vitamin D and calcium.  Will recheck vitamin D this visit    I have seen and examined the patient and agree with the fellow's plan of care as noted.  October 17,  2019    Dr. Kelsey Hendricks [phone]    HISTORY OF PRESENT ILLNESS  Dorcas is a 64 year old female with history of Hashimoto's thyroiditis, osteopenia, breast cancer 2001 in remission status post chemoradiation surgery, 2.5 years of tamoxifen, dyslipidemia presented for evaluation of hypothyroidism.     She presented here to help with getting to constant TSH level.  She was diagnosed with hypothyroidism 18 years ago. in the past she had fluctuating TSH level with frequent changes in her levothyroxine supplementation. As shown below she was taking levothyroxine 0.75 micrograms 6 days a week, and was found to have TSH 4.75 6/2019, levothyroxine increased to 0.75 mcg daily.  Then in 8/2019 TSH found to be low 0.27 and levothyroxine switched back to 0.75 mcg 6 day/week.  Patient stated that the best TSH range for her is between 1.0-1.5.  She said that when TSH was low she had symptoms of fatigue, increased appetite, trouble sleeping and when TSH was on higher side she felt sleepy, cold, tired.  She admitted that part of her symptoms could be affected by recent EBV reactivation and also at that she only started back exercising after 8 months of rest and she could have overexerted herself.  She was taking biotin in the past but last use was a few years ago         2/12/2019 anti-TPO antibody 13.8 elevated, TSH 1.8, T4 free 1.3, T3 free 2.1 [facility]         Sx of neck compression-none  Family history-Cousin, mother, aunt hypothyrodism  Radiation to neck, Iodinated contrast, neck surgery-none  Biotin supplementation-a few years ago currently on multivitamin.  health    Wt Readings from Last 10 Encounters:   10/17/19 48.8 kg (107 lb 8 oz)   10/20/15 46.4 kg (102 lb 3.2 oz)   09/27/11 53.9 kg (118 lb 12.8 oz)     Regarding her osteopenia,  she used Fosamax in the past for 5 years last use was probably 2009.  DEXA scan post Fosamax therapy showed stable bone density. Then subsequently bone scan 6/7/2018 again showed bone  loss in the lumbar spine area.  She denies history of fracture.  Her mother, aunt, sister all have osteoporosis.  She barely takes yogurt and does not drink milk or take Cheese.  Also low on green leafy vegetables.  She takes vitamin D 1000 units daily last vitamin D was 64 in 2018    REVIEW OF SYSTEMS    10 system ROS otherwise as per the HPI or negative    Past Medical History  Past Medical History:   Diagnosis Date     Abnormal Pap smear     Along time ago. Been fine since.     Breast disorder Nov 2000    Cancer     History of breast cancer 2000    ER +     HSV infection      Hypothyroid        Medications  Current Outpatient Medications   Medication     acyclovir (ZOVIRAX) 200 MG capsule     biotin 2.5 mg/mL     Cholecalciferol (VITAMIN D PO)     Clobetasol Prop Crea-Coal Tar 0.05 & 2.3 % KIT     levothyroxine (SYNTHROID) 75 MCG tablet     levothyroxine (SYNTHROID) 88 MCG tablet     multivitamin, therapeutic with minerals (MULTI-VITAMIN) TABS     Omega-3 Fatty Acids (OMEGA 3 PO)     selenium 50 MCG TABS     No current facility-administered medications for this visit.        Current Outpatient Medications   Medication Sig Dispense Refill     acyclovir (ZOVIRAX) 200 MG capsule Take 1 capsule (200 mg) by mouth 5 times daily 25 capsule 5     biotin 2.5 mg/mL Take 1,000 mg by mouth daily       Cholecalciferol (VITAMIN D PO) Take  by mouth.       Clobetasol Prop Crea-Coal Tar 0.05 & 2.3 % KIT        levothyroxine (SYNTHROID) 75 MCG tablet Take 75 mcg by mouth daily.       levothyroxine (SYNTHROID) 88 MCG tablet        multivitamin, therapeutic with minerals (MULTI-VITAMIN) TABS Take 1 tablet by mouth daily       Omega-3 Fatty Acids (OMEGA 3 PO) Take  by mouth.       selenium 50 MCG TABS Take 200 mcg by mouth daily         Allergies  No Known Allergies      Family History  family history includes Anxiety Disorder in her brother and niece; Breast Cancer in her maternal grandmother and sister; Colon Cancer in her  "paternal grandfather; Depression in her brother and mother; Diabetes in her mother; Hypertension in her brother, father, and mother; Mental Illness in her brother, mother, and sister; Osteoporosis in her mother and sister; Other Cancer in her father and mother; Substance Abuse in her brother, father, and sister; Thyroid Disease in her cousin and mother.    Social History  Social History     Tobacco Use     Smoking status: Never Smoker   Substance Use Topics     Alcohol use: Yes     Drug use: No       Physical Exam  /77   Pulse 73   Ht 1.676 m (5' 6\")   Wt 48.8 kg (107 lb 8 oz)   BMI 17.35 kg/m    GENERAL :  In no apparent distress  SKIN: Normal color, normal temperature, texture.  No hirsutism, alopecia or purple striae.     EYES: PERRL, EOMI, No scleral icterus,  No proptosis, conjunctival redness, stare, retraction  MOUTH: Moist, pink; pharynx clear  NECK: No visible masses. No palpable adenopathy, or masses. No carotid bruits. THYROID:  Normal, nontender, smooth / firm texture,  no nodules, no Bruit   RESP: Lungs clear to auscultation bilaterally  CARDIAC: Regular rate and rhythm, normal S1 S2, without murmurs, rubs or gallops  ABDOMEN: Normal bowel sounds; soft, nontender, no HSM or masses       NEURO: awake, alert, responds appropriately to questions.  Cranial nerves intact.  Moves all extremities; Gait normal.  No tremor of the outstretched hand.  DTRs   2/4 ,   EXTREMITIES: No clubbing, cyanosis or edema.    DATA REVIEW    Result Impression   Impression: Negative for evidence of adenopathy.      Reading Radiologist: Derek Au   Result Narrative   Examination: Ultrasound soft tissues neck.    Indication: Concern for lymphadenopathy. Pain.    Right: There is a small lymph node 6.5 mm maximal dimension zone 1. No other identified lymph nodes.    Left: No identified lymph nodes.   Other Result Information   Interface, [facility] - 04/22/2019  2:09 PM CDT  Examination: Ultrasound soft " tissues neck.    Indication: Concern for lymphadenopathy. Pain.    Right: There is a small lymph node 6.5 mm maximal dimension zone 1. No other identified lymph nodes.    Left: No identified lymph nodes.    IMPRESSION  Impression: Negative for evidence of adenopathy.      Reading Radiologist: Derek Au     Result Narrative   6/7/2018      RE: Dorcas Roman  YOB: 1955        Dear Priya Alan,    Patient Profile:  62 y.o. female, postmenopausal, is here for the follow up bone density   test.   History of fractures - None. Family history of osteoporosis - Yes;    mother.  Family history of hip fracture: Yes;  mother. Smoking history -   No. Osteoporosis treatment past -  Yes;  Bisphosphonates. Osteoporosis   treatment current - No.  Chronic medical problems - Breast cancer and   Radiation treatment. High risk medications -  Chemotherapy;  Yes, in the   Past and Thyroid;  Yes, Currently.      Assessment:    1. The spine bone density L1-L2 with T-score -1.6.  2. Femoral bone densities show left total hip T- score -1.6 and right   total hip T-score -1.3.  3. Trabecular bone score indicates good trabecular bone architecture.      62 y.o. female with LOW BONE DENSITY (OSTEOPENIA) and MODERATE fracture   risk, adjusted for the TBS, with major osteoporotic fracture risk 13.0 %   and hip fracture risk 0.8 %.     There is a previous bone density that was performed on a different scanner   at [facility].  Therefore, the results are not directly   comparable.  However, since the scan performed on April 4, 2013, there has   been a 5.2% decline in the lumbar vertebrae.  Additionally, a 4.9% decline   is seen in the left total hip along with a 5.3% decline in the right total   hip.    Recommendations:  Appropriate calcium, vitamin D supplements, along with balance and weight   bearing exercises, are recommended with follow up bone density scan in 2   years.      Bone densitometry was performed on  your patient using our [place] iDXA   densitometer. The results are summarized and a copy of the actual scans   are included for your review. In conformity with the International Society   of Clinical Densitometry's most recent position statement for DXA   interpretation (2015), the diagnosis will be made on the lowest measured   T-score of the lumbar spine, femoral neck, total proximal femur or 33%   radius. Note the change in terminology for diagnostic classification from   OSTEOPENIA to LOW BONE MASS. All trending for sequential exams will be   done using multiple vertebrae or the total proximal femur. Fracture risk   is based on the WHO Fracture Risk Assessment Tool (FRAX). If additional   information is needed or if you would like to discuss the results, please   do not hesitate to call me.       Thank you for referring this patient to [facility].   We are happy to be of service in support of you and your practice. If you   have any questions or suggestions to improve our service, please call me   at [phone].     Sincerely,     Vivian Rowley M.D. C.C.CAYLA.  Osteoporosis Services, [facility]     Lilly Lawrence   Endocrinology Fellow PGY-4  Discussed with staff endocrinologist Kelsey Toussaint

## 2019-10-18 ENCOUNTER — TELEPHONE (OUTPATIENT)
Dept: ENDOCRINOLOGY | Facility: CLINIC | Age: 64
End: 2019-10-18

## 2019-10-18 LAB
TTG IGA SER-ACNC: 1 U/ML
TTG IGG SER-ACNC: <1 U/ML

## 2019-10-18 NOTE — TELEPHONE ENCOUNTER
A 90 day supply of tirosint is $225 and she cannot use and  Coupons or copay cards. Priscilla Grider RN on 10/18/2019 at 5:17 PM

## 2019-10-18 NOTE — TELEPHONE ENCOUNTER
----- Message from Lilly Lawrence MD sent at 10/17/2019 11:08 AM CDT -----  Please call pharmacy how much does tirosin cost for patient.

## 2019-10-22 ENCOUNTER — COMMUNICATION - HEALTHEAST (OUTPATIENT)
Dept: FAMILY MEDICINE | Facility: CLINIC | Age: 64
End: 2019-10-22

## 2019-10-22 DIAGNOSIS — E03.9 HYPOTHYROID: ICD-10-CM

## 2019-10-25 ENCOUNTER — TELEPHONE (OUTPATIENT)
Dept: ENDOCRINOLOGY | Facility: CLINIC | Age: 64
End: 2019-10-25

## 2019-10-25 NOTE — TELEPHONE ENCOUNTER
Hi,     I tried calling you today to discuss if you still would like to be on Tirosint. (it is expensive), I left a voicemail.  Your thyroid function looks good otherwise. You can reply me on my chart as well. Thank you.     Lilly Lawrence   Endocrinology Fellow PGY-4

## 2019-12-16 ENCOUNTER — HEALTH MAINTENANCE LETTER (OUTPATIENT)
Age: 64
End: 2019-12-16

## 2020-02-28 ENCOUNTER — COMMUNICATION - HEALTHEAST (OUTPATIENT)
Dept: FAMILY MEDICINE | Facility: CLINIC | Age: 65
End: 2020-02-28

## 2020-02-28 DIAGNOSIS — L94.0 CIRCUMSCRIBED SCLERODERMA: ICD-10-CM

## 2020-06-05 ENCOUNTER — COMMUNICATION - HEALTHEAST (OUTPATIENT)
Dept: FAMILY MEDICINE | Facility: CLINIC | Age: 65
End: 2020-06-05

## 2020-06-05 DIAGNOSIS — E03.9 HYPOTHYROID: ICD-10-CM

## 2020-06-09 ENCOUNTER — COMMUNICATION - HEALTHEAST (OUTPATIENT)
Dept: FAMILY MEDICINE | Facility: CLINIC | Age: 65
End: 2020-06-09

## 2020-06-09 DIAGNOSIS — E03.9 HYPOTHYROID: ICD-10-CM

## 2020-07-01 ENCOUNTER — COMMUNICATION - HEALTHEAST (OUTPATIENT)
Dept: FAMILY MEDICINE | Facility: CLINIC | Age: 65
End: 2020-07-01

## 2021-01-15 ENCOUNTER — HEALTH MAINTENANCE LETTER (OUTPATIENT)
Age: 66
End: 2021-01-15

## 2021-05-29 NOTE — TELEPHONE ENCOUNTER
Spoke with Dorcas. Immunizations must of crossed over sometime after I gave the Prevnar 13. I informed her there is no harm in getting a second one and she will be due for the PPSV after 9/13/19. I also spoke with our manager and she will not be charged for this vaccine

## 2021-05-29 NOTE — PROGRESS NOTES
Assessment and Plan:     1. Routine general medical examination at a health care facility  Routine Medicare wellness visit, updated in EMR.  Immunizations updated today.  Colon cancer screening discussed, see below for further plans.  Bone density testing up-to-date, will be due next year.  Mammogram due, discussed and ordered.  Pap smear updated today also.  Plan repeat physical in 1 year.    2. Chronic fatigue  Extensive work-up completed with integrative medicine and positive for possible Angela-Barr virus recurrence.  No specific treatment recommended in this regard.  Notably missing is Lyme serology.  We discussed the possible pitfalls of this testing today but patient wishes to proceed.  - Lyme Antibody Cascade    3. HSV (herpes simplex virus) infection  Patient takes Valtrex suppressive therapy daily, prescribed by her integrative medicine provider.  This is genital herpes.  - valACYclovir (VALTREX) 500 MG tablet; Take 1 tablet (500 mg total) by mouth daily.  Dispense: 30 tablet; Refill: 0    4. Hypothyroidism due to Hashimoto's thyroiditis  TSH is increased from previous.  Sent message to patient to see whether she has been compliant with this medication.  If she has been compliant, would possibly increase the dose a touch.  - Thyroid Stimulating Hormone (TSH)    5. Hyperlipidemia, unspecified hyperlipidemia type  Discussed that she is low risk for heart disease or stroke, would not treat this unless LDL is greater than 190.  - Lipid [place] FASTING    6. Osteopenia  Patient will be due for follow-up bone density testing next year.    7. Papanicolaou smear of cervix with positive high risk human papilloma virus (HPV) test  Patient had positive HPV on her Pap smear last year.  Recommend repeat today and this was performed.  - Gynecologic Cytology (PAP Smear)    8. Encounter for screening for malignant neoplasm of cervix   Medicare require diagnosis for Pap smear today.  - Gynecologic Cytology (PAP  "Smear)    9. Screening for diabetes mellitus  Fasting glucose updated today.  - Glucose    10. Screening for colon cancer  After discussion of risks and benefits of available options, patient will try Cologuard.  She previously was performing yearly fecal occult blood testing.  - Cologuard      The patient's current medical problems were reviewed.    I have had an Advance Directives discussion with the patient.  The following health maintenance schedule was reviewed with the patient and provided in printed form in the after visit summary:   Health Maintenance   Topic Date Due     PNEUMOCOCCAL POLYSACCHARIDE VACCINE AGE 65 AND OVER  07/01/2018     FECAL OCCULT BLOOD/FIT ANNUAL COLON CANCER SCREENING  11/12/2019     ADVANCE DIRECTIVES DISCUSSED WITH PATIENT  04/06/2020     MAMMOGRAM  06/07/2020     DXA SCAN  06/07/2020     FALL RISK ASSESSMENT  06/21/2020     TD 18+ HE  12/16/2027     INFLUENZA VACCINE RULE BASED  Completed     TDAP ADULT ONE TIME DOSE  Completed     PNEUMOCOCCAL CONJUGATE VACCINE FOR ADULTS (PCV13 OR PREVNAR)  Completed     ZOSTER VACCINES  Completed        Subjective:   Chief Complaint: Dorcas Roman is an 63 y.o. female here for an Annual Wellness visit.     HPI: Since I last saw patient, she established with an integrative medicine provider for her chronic fatigue.  This is quite intense, she needed to take multiple naps daily.  Only significant findings were positive serology for Angela-Barr virus.  Patient tried various herbal interventions for this without much benefit.  No other specific therapies were suggested.  She is wondering if I have any ideas in this regard if she continues to feel fatigued.  She is unaware of any previous history of Lyme disease, does have a history of a tick bite with a \"small tick\" that had to be removed at an urgent care.  She was never treated or tested for Lyme to her knowledge.  She is unsure if this was a deer tick in the past.  She does not recall any febrile " "illness with body aches.    Review of Systems:  Please see above.  The rest of the complete review of systems are negative for all systems.    Patient Care Team:  Priya Alan MD as PCP - General (Family Medicine)     Patient Active Problem List   Diagnosis     Postmenopausal atrophic vaginitis     Lichen Sclerosus Et Atrophicus     Osteopenia     Hyperlipidemia     Breast Cancer     Vitamin D deficiency     HSV (herpes simplex virus) infection     Hypothyroidism due to Hashimoto's thyroiditis     Ocular rosacea     Menopause     Past Medical History:   Diagnosis Date     Breast cancer (H) 2000    right     Breast cyst      Concussion 1970    struck her head while tobogganing     Herpes Simplex Type II      Hx antineoplastic chemotherapy 2000    possibly adriamycin and Taxol     Hx of radiation therapy      Inverted nipple      Postmenopausal      Vasovagal syncope 1965    fainted while shopping with father who \"forgot to feed her\"      Past Surgical History:   Procedure Laterality Date     BREAST BIOPSY Right 2000     MASTECTOMY Right 2000      Family History   Problem Relation Age of Onset     COPD Mother         smoker     Heart failure Mother      Diabetes type II Mother      Sudden death Mother 81         alone at home, no autopsy     Depression Mother      Alcohol abuse Father      Lung cancer Father 68     Esophageal cancer Father      Pulmonary embolism Father 68        fatal event     Breast cancer Sister 52        cause of death     Bipolar disorder Sister      Breast cancer Maternal Grandmother      Heart disease Maternal Grandmother      Bipolar disorder Brother      Alcoholism Brother         and a smoker     Alcoholism Sister         and a smoker      Social History     Socioeconomic History     Marital status: Single     Spouse name: Not on file     Number of children: 0     Years of education: Not on file     Highest education level: Not on file   Occupational History     Occupation: " management at [employer]     Employer: RETIRED     Comment: 30 years   Social Needs     Financial resource strain: Not on file     Food insecurity:     Worry: Not on file     Inability: Not on file     Transportation needs:     Medical: Not on file     Non-medical: Not on file   Tobacco Use     Smoking status: Never Smoker     Smokeless tobacco: Never Used   Substance and Sexual Activity     Alcohol use: Yes     Alcohol/week: 0.0 - 1.8 oz     Drug use: No     Sexual activity: Yes     Partners: Male     Comment: boyfriend   Lifestyle     Physical activity:     Days per week: Not on file     Minutes per session: Not on file     Stress: Not on file   Relationships     Social connections:     Talks on phone: Not on file     Gets together: Not on file     Attends Synagogue service: Not on file     Active member of club or organization: Not on file     Attends meetings of clubs or organizations: Not on file     Relationship status: Not on file     Intimate partner violence:     Fear of current or ex partner: Not on file     Emotionally abused: Not on file     Physically abused: Not on file     Forced sexual activity: Not on file   Other Topics Concern     Not on file   Social History Narrative    Lives alone in her own condominium. 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      Current Outpatient Medications   Medication Sig Dispense Refill     CHOLECALCIFEROL, VITAMIN D3, (VITAMIN D3 ORAL) Take 2,000 Units by mouth daily.        clobetasol (TEMOVATE) 0.05 % cream Apply topically 2 (two) times a week. 30 g 0     DOCOSAHEXANOIC ACID/EPA (FISH OIL ORAL) 1 capsule daily. 1200 MG       levothyroxine (SYNTHROID, LEVOTHROID) 75 MCG tablet Take 1 tablet (75 mcg total) by mouth daily. 90 tablet 3     MAGNESIUM GLYCINATE ORAL Take 1 tablet by mouth daily.        multivitamin therapeutic tablet Take 1 tablet by mouth daily.       valACYclovir (VALTREX) 500 MG tablet  "Take 1 tablet (500 mg total) by mouth daily. 30 tablet 0     No current facility-administered medications for this visit.       Objective:     Visit Vitals  /68 (Patient Site: Left Arm, Patient Position: Sitting, Cuff Size: Adult Regular)   Pulse 80   Resp 20   Ht 5' 5.5\" (1.664 m)   Wt 104 lb 6.4 oz (47.4 kg)   Breastfeeding? No   BMI 17.11 kg/m         VisionScreening:  No exam data present     PHYSICAL EXAM  /68 (Patient Site: Left Arm, Patient Position: Sitting, Cuff Size: Adult Regular)   Pulse 80   Resp 20   Ht 5' 5.5\" (1.664 m)   Wt 104 lb 6.4 oz (47.4 kg)   Breastfeeding? No   BMI 17.11 kg/m      General Appearance:    Alert, cooperative, no distress, appears stated age   Head:    Normocephalic, without obvious abnormality, atraumatic   Eyes:    PERRL, conjunctiva/corneas clear, EOM's intact both eyes   Ears:    Normal TM's and external ear canals, both ears   Nose:   Nares normal, septum midline, mucosa normal, no drainage     or sinus tenderness   Throat:   Lips, mucosa, and tongue normal; teeth and gums normal   Neck:   Supple, symmetrical, trachea midline, no adenopathy;     thyroid:  no enlargement/tenderness/nodules; no carotid    bruit or JVD   Back:     Symmetric, no curvature, ROM normal, no CVA tenderness   Lungs:     Clear to auscultation bilaterally, respirations unlabored   Chest Wall:    No tenderness or deformity    Heart:    Regular rate and rhythm, S1 and S2 normal, no murmur, rub    or gallop   Breast Exam:    No tenderness, masses, or nipple abnormality; right breast surgically absent   Abdomen:     Soft, non-tender, bowel sounds active all four quadrants,     no masses, no organomegaly   Genitalia:    Normal female without lesion, discharge or tenderness; cervix appears normal with mild atrophy, no significant discharge   Rectal:    Not performed   Extremities:   Extremities normal, atraumatic, no cyanosis or edema   Pulses:   2+ and symmetric all extremities   Skin:   " Skin color, texture, turgor normal, no rashes or lesions   Lymph nodes:   Cervical, supraclavicular, and axillary nodes normal   Neurologic:   CNII-XII intact, normal strength, sensation and reflexes     throughout         Assessment Results 6/21/2019   Activities of Daily Living No help needed   Instrumental Activities of Daily Living No help needed   Mini Cog Total Score 5   Some recent data might be hidden     A Mini-Cog score of 0-2 suggests the possibility of dementia, score of 3-5 suggests no dementia    Identified Health Risks:     The patient was provided with suggestions to help her develop a healthy physical lifestyle.   Patient's advanced directive was discussed and I am comfortable with the patient's wishes.

## 2021-05-29 NOTE — TELEPHONE ENCOUNTER
Medication Request  Medication name:  CLobetasol (TEMOVATE) 0.05 % cream  Pharmacy Name and Location:  [facility]  Reason for request: Pt uses this cream 2-3x week-  Tube is  , requesting refill.     When did you use medication last?:  Mon  Okay to leave a detailed message: yes

## 2021-05-29 NOTE — TELEPHONE ENCOUNTER
Question following Office Visit  When did you see your provider: 6/21/19  What is your question: Patient has had PCV-13 on 9/13/18 and again today as it has only been 9 monthy since first injection.  Patient is 63 years old.  She is asking why she got same injection.  When should she get Pneumo-23?   Okay to leave a detailed message: Yes

## 2021-05-29 NOTE — PATIENT INSTRUCTIONS - HE
Patient Education     Your Health Risk Assessment indicates you feel you are not in good physical health.    A healthy lifestyle helps keep the body fit and the mind alert. It helps protect you from disease, helps you fight disease, and helps prevent chronic disease (disease that doesn't go away) from getting worse. This is important as you get older and begin to notice twinges in muscles and joints and a decline in the strength and stamina you once took for granted. A healthy lifestyle includes good healthcare, good nutrition, weight control, recreation, and regular exercise. Avoid harmful substances and do what you can to keep safe. Another part of a healthy lifestyle is stay mentally active and socially involved.    Good healthcare     Have a wellness visit every year.     If you have new symptoms, let us know right away. Don't wait until the next checkup.     Take medicines exactly as prescribed and keep your medicines in a safe place. Tell us if your medicine causes problems.   Healthy diet and weight control     Eat 3 or 4 small, nutritious, low-fat, high-fiber meals a day. Include a variety of fruits, vegetables, and whole-grain foods.     Make sure you get enough calcium in your diet. Calcium, vitamin D, and exercise help prevent osteoporosis (bone thinning).     If you live alone, try eating with others when you can. That way you get a good meal and have company while you eat it.     Try to keep a healthy weight. If you eat more calories than your body uses for energy, it will be stored as fat and you will gain weight.     Recreation   Recreation is not limited to sports and team events. It includes any activity that provides relaxation, interest, enjoyment, and exercise. Recreation provides an outlet for physical, mental, and social energy. It can give a sense of worth and achievement. It can help you stay healthy.         Advance Directive  Patient s advance directive was discussed and I am comfortable  with the patient s wishes.  Patient Education   Personalized Prevention Plan  You are due for the preventive services outlined below.  Your care team is available to assist you in scheduling these services.  If you have already completed any of these items, please share that information with your care team to update in your medical record.  Health Maintenance   Topic Date Due     PNEUMOCOCCAL POLYSACCHARIDE VACCINE AGE 65 AND OVER  07/01/2018     FECAL OCCULT BLOOD/FIT ANNUAL COLON CANCER SCREENING  11/12/2019     ADVANCE DIRECTIVES DISCUSSED WITH PATIENT  04/06/2020     MAMMOGRAM  06/07/2020     DXA SCAN  06/07/2020     FALL RISK ASSESSMENT  06/21/2020     TD 18+ HE  12/16/2027     INFLUENZA VACCINE RULE BASED  Completed     TDAP ADULT ONE TIME DOSE  Completed     PNEUMOCOCCAL CONJUGATE VACCINE FOR ADULTS (PCV13 OR PREVNAR)  Completed     ZOSTER VACCINES  Completed

## 2021-05-30 VITALS — BODY MASS INDEX: 16.29 KG/M2 | HEIGHT: 66 IN | WEIGHT: 101.4 LBS

## 2021-05-31 VITALS — HEIGHT: 66 IN | WEIGHT: 102.7 LBS | BODY MASS INDEX: 16.5 KG/M2

## 2021-05-31 VITALS — BODY MASS INDEX: 16.39 KG/M2 | WEIGHT: 102 LBS | HEIGHT: 66 IN

## 2021-05-31 VITALS — BODY MASS INDEX: 16.88 KG/M2 | WEIGHT: 105 LBS | HEIGHT: 66 IN

## 2021-05-31 NOTE — TELEPHONE ENCOUNTER
Spoke with pt regarding today's low TSH level. She had recently increased levothyrxoine 75mcg from 6 days/week to daily.  I recommend we decrease levothyroxine back to 6 days/week and recheck TSH.  Pt says she has had difficulty managing her thyroid levels for several years.  Has not seen endocrinology in many years.  She is frustrated with her symptoms.  Would recommend referral to endocrinology for further evaluation.

## 2021-05-31 NOTE — TELEPHONE ENCOUNTER
Test Results  Who is calling?:  Patient  Who ordered the test:  ARMANDO Alan  Type of test: Lab  Date of test:  7/10/19  Where was the test performed:  [facility]  What are your questions/concerns?:  Patient had not heard results of Cologuard..  Reviewed chart, Cologuard results reported as Negative.  Patient was advised of results.  Okay to leave a detailed message?:  Yes   No call back needed by patient.

## 2021-06-01 VITALS — WEIGHT: 105.6 LBS | HEIGHT: 65 IN | BODY MASS INDEX: 17.59 KG/M2

## 2021-06-01 NOTE — TELEPHONE ENCOUNTER
Pt received pneumo 23 today which is 12.5 weeks after her last pneumo 13 but 1 year after her first pneumo 13. Per Dr. Anny lAan    Please sign pended immunization

## 2021-06-02 VITALS — WEIGHT: 107.1 LBS | BODY MASS INDEX: 17.84 KG/M2 | HEIGHT: 65 IN

## 2021-06-02 NOTE — TELEPHONE ENCOUNTER
Who is calling:  Patient  Reason for Call:  Patient stated she would like her refill sent [facility] and not [facility]. Please re-send the patient's refill of her levothyroxine to [facility]. [facility] pharmacy was pended.  Date of last appointment with primary care: 6/21/19  Okay to leave a detailed message: No

## 2021-06-03 VITALS — BODY MASS INDEX: 16.78 KG/M2 | HEIGHT: 66 IN | WEIGHT: 104.4 LBS

## 2021-06-06 NOTE — TELEPHONE ENCOUNTER
RN cannot approve Refill Request    RN can NOT refill this medication med is not covered by policy/route to provider     . Last office visit: 12/20/2017 Priya Alan MD Last Physical: 6/21/2019 Last MTM visit: Visit date not found Last visit same specialty: 9/13/2018 Nati Walter MD.  Next visit within 3 mo: Visit date not found  Next physical within 3 mo: Visit date not found      Ginette Jara, [facility] Triage/Med Refill 2/28/2020    Requested Prescriptions   Pending Prescriptions Disp Refills     clobetasoL (TEMOVATE) 0.05 % cream 30 g 0     Sig: Apply topically 2 (two) times a week. Please send refill request through [employer]  IF need to fax use only this dedicated line [phone]       There is no refill protocol information for this order

## 2021-06-06 NOTE — TELEPHONE ENCOUNTER
Refill Request  Did you contact pharmacy: Yes Patient states the order was request on Monday. Please expedite if possible. Please advise.   Medication name:   Requested Prescriptions     Pending Prescriptions Disp Refills     clobetasoL (TEMOVATE) 0.05 % cream 30 g 0     Sig: Apply topically 2 (two) times a week.     Who prescribed the medication: Priya Alan MD  [facility]  Is patient out of medication: no  Patient notified refills processed in 3 business days:  yes  Okay to leave a detailed message: no

## 2021-06-08 NOTE — TELEPHONE ENCOUNTER
Spoke with pt. She states she did not request. Refill. Advised her sometimes pharmacy does auto refills, pt states she is not on any type of auto refill.   Pt states she does see someone at [facility]. I advise pt if she needed refills from Dr. Alan she would need to schedule an appointment. Either in person, or virtually. Pt agreed to call back and reschedule. Gave pt number for clinic scheduling.

## 2021-06-08 NOTE — TELEPHONE ENCOUNTER
RN cannot approve Refill Request    RN can NOT refill this medication PCP please review- discrepancy between listed Rx dose in instructions and requested . Last office visit: 12/20/2017 Priya Alan MD Last Physical: 6/21/2019 Last MTM visit: Visit date not found Last visit same specialty: 9/13/2018 Nati Walter MD.  Next visit within 3 mo: Visit date not found  Next physical within 3 mo: Visit date not found      Trena MEMBRENO Melanie, [facility] Triage/Med Refill 6/9/2020    Requested Prescriptions   Pending Prescriptions Disp Refills     levothyroxine (SYNTHROID, LEVOTHROID) 75 MCG tablet [Pharmacy Med Name: LEVOTHYROXINE  75MCG  TAB] 78 tablet 0     Sig: TAKE 75 MCG (1 TABLET)  DAILY FOR 6 DAYS PER WEEK.       Thyroid Hormones Protocol Passed - 6/5/2020  4:46 AM        Passed - Provider visit in past 12 months or next 3 months     Last office visit with prescriber/PCP: 12/20/2017 Priya Alan MD OR same dept: Visit date not found OR same specialty: 9/13/2018 Nati Walter MD  Last physical: 6/21/2019 Last MTM visit: Visit date not found   Next visit within 3 mo: Visit date not found  Next physical within 3 mo: Visit date not found  Prescriber OR PCP: Priya Alan MD  Last diagnosis associated with med order: 1. Hypothyroid  - levothyroxine (SYNTHROID, LEVOTHROID) 75 MCG tablet [Pharmacy Med Name: LEVOTHYROXINE  75MCG  TAB]; TAKE 75 MCG (1 TABLET)  DAILY FOR 6 DAYS PER WEEK.  Dispense: 78 tablet; Refill: 0    If protocol passes may refill for 12 months if within 3 months of last provider visit (or a total of 15 months).             Passed - TSH on file in past 12 months for patient age 12 & older     TSH   Date Value Ref Range Status   08/09/2019 0.27 (L) 0.30 - 5.00 uIU/mL Final

## 2021-06-09 NOTE — TELEPHONE ENCOUNTER
Called patient and notified of message.  Patient has been seeing a different physician at [facility] for many different things and has had many tests and that  Did not feel that she needed a Physical at this time and just suggested for her to get a DEXA and patient would like Dr. Alan to order it so she can stay In the same network for consistency of the test results. If you will not order that other  Can. But she would like you to order.

## 2021-06-09 NOTE — TELEPHONE ENCOUNTER
I don't understand.  Is she changing her PCP or going to think about whether she wants a physical or med check?

## 2021-06-09 NOTE — TELEPHONE ENCOUNTER
Orders being requested: Dexa scan   Reason service is needed/diagnosis: Osteopenia   When are orders needed by: as soon as possible   Where to send Orders: [facility] fax [phone]  Okay to leave detailed message?  Yes  [phone]

## 2021-06-09 NOTE — TELEPHONE ENCOUNTER
She needs to decide who she wants to be her PCP.  I will not simply order her bone density tests, she would need to also follow with me as a patient.  If she wants to change her PCP, then her other doc can order the DXA and still possibly get it within our system.

## 2021-06-10 ENCOUNTER — RECORDS - HEALTHEAST (OUTPATIENT)
Dept: BONE DENSITY | Facility: CLINIC | Age: 66
End: 2021-06-10

## 2021-06-10 DIAGNOSIS — M85.80 OTHER SPECIFIED DISORDERS OF BONE DENSITY AND STRUCTURE, UNSPECIFIED SITE: ICD-10-CM

## 2021-06-10 DIAGNOSIS — Z78.0 ASYMPTOMATIC MENOPAUSAL STATE: ICD-10-CM

## 2021-06-10 NOTE — PROGRESS NOTES
Assessment/Plan:     1. Routine general medical examination at a health care facility  Routine history and physical, updated in EMR.  Fasting labs updated as below.  Immunizations are up-to-date.  Mammogram ordered, left breast only.  Discussed fecal occult blood testing versus Cologuard for colon cancer screening, see below.  Patient will check with her insurance.  Plan repeat physical in 1 year.  - Lipid Cascade FASTING  - Thyroid Stimulating Hormone (TSH)  - Basic Metabolic Panel  - Hemoglobin  - Cologuard  - Mammo Screening Bilateral; Future    2. Herpes simplex vulvovaginitis  Patient no longer takes antivirals daily.  She will use Valtrex as needed for outbreaks.  - valACYclovir (VALTREX) 500 MG tablet; Take 1 tablet (500 mg total) by mouth 2 (two) times a day for 3 days.  Dispense: 6 tablet; Refill: 3    3. Osteopenia  Recommended follow-up bone density testing.  Patient will arrange this.  - DXA Bone Density Scan; Future    4. Hypothyroidism, unspecified type  Continues on levothyroxine, TSH within goal range.  - Thyroid Stimulating Hormone (TSH)    5. Hyperlipidemia, unspecified hyperlipidemia type  Taking fish oil daily.  Lipid profile remains in a low risk range.  - Lipid Cascade FASTING  - Basic Metabolic Panel    6. Primary insomnia  Patient wonders if she can use trazodone as needed for sleep.  This also helps with occasional depressed mood.  This was refilled for her today.  - Basic Metabolic Panel    7. Screening mammogram, encounter for  - Mammo Screening Bilateral; Future    8. Screening for colon cancer  - Cologuard      Subjective:      Docras Roman is a 61 y.o. female who presents for an annual exam.     She does occasionally experience some right ear plugging.  On exam this is normal, we discussed possible eustachian tube dysfunction and follow-up if she experiences any diminished hearing on this side.  She will try saline nasal spray in the meantime.  She is also wondering if she can take  trazodone as needed at bedtime for sleep.  She would prefer not to take something every day.  She has been on this in the past and it seemed to work well.    Healthy Habits:   Regular Exercise: Yes  Sunscreen Use: Yes  Healthy Diet: Yes  Dental Visits Regularly: Yes  Seat Belt: Yes  Sexually active: Yes  Self Breast Exam Monthly:No  Hemoccults: Yes and 2016, normal  Colonoscopy: Yes and age 49, normal  Lipid Profile: Yes  Glucose Screen: Yes  Prevention of Osteoporosis: Yes  Last Dexa: Yes and 4/20/15 - osteopenia      Immunization History   Administered Date(s) Administered     DTaP, historic 2006, 2006, 10/27/2006, 2007, 2011     Hep A, historic 2007, 2007     Hep B, Peds, Historic 2006, 2006, 10/27/2006     HiB, historic 2006, 2006, 2007     IPV 1992     Influenza [id number], 2010     Influenza, inj, historic 2009, 10/23/2010, 10/15/2011     Influenza, seasonal,quad inj 36+ mos 2015     Influenza, seasonal,quad inj 6-35 mos 10/01/2012, 10/01/2014     Influenza,seasonal quad, PF 10/28/2013     Tdap 2012     ZOSTER 2013     Immunization status: up to date and documented.    No exam data present    Gynecologic History  No LMP recorded. Patient is postmenopausal.  Contraception: post menopausal status  Last Pap: 4/3/14. Results were: normal  Last mammogram: 2016. Results were: normal      OB History    Para Term  AB SAB TAB Ectopic Multiple Living   0 0 0                    Current Outpatient Prescriptions   Medication Sig Dispense Refill     CALCIUM CARBONATE/VITAMIN D3 (CALCIUM+D ORAL) 1 tablet daily.       clobetasol (TEMOVATE) 0.05 % cream Apply topically 2 (two) times a day.       DOCOSAHEXANOIC ACID/EPA (FISH OIL ORAL) 1 capsule daily. 1200 MG       levothyroxine (SYNTHROID) 88 MCG tablet Take 1 tablet once daily. 90 tablet 3     levothyroxine (SYNTHROID, LEVOTHROID) 75 MCG tablet Take 1 tablet (75  mcg total) by mouth daily. 90 tablet 4     acyclovir (ZOVIRAX) 200 MG capsule TAKE 1 CAPSULE (200 MG) BY MOUTH 5 TIMES DAILY  2     CHOLECALCIFEROL, VITAMIN D3, (VITAMIN D3 ORAL) Take by mouth.       valACYclovir (VALTREX) 500 MG tablet Take 1 tablet (500 mg total) by mouth daily. 90 tablet 3     No current facility-administered medications for this visit.      Past Medical History:   Diagnosis Date     Breast cancer 2000    right     Breast cyst      Hx antineoplastic chemotherapy      Hx of radiation therapy      Inverted nipple      Past Surgical History:   Procedure Laterality Date     BREAST BIOPSY Right 2000     MASTECTOMY Right 2000     Review of patient's allergies indicates no known allergies.  Family History   Problem Relation Age of Onset     Heart disease Mother      Diabetes Mother      Alcohol abuse Father      Cancer Father      Cancer Sister      Breast cancer Sister 52         59     No Medical Problems Daughter      Breast cancer Maternal Grandmother      No Medical Problems Maternal Grandfather      No Medical Problems Paternal Grandmother      No Medical Problems Paternal Grandfather      No Medical Problems Maternal Aunt      No Medical Problems Paternal Aunt      BRCA 1/2 Neg Hx      Colon cancer Neg Hx      Endometrial cancer Neg Hx      Ovarian cancer Neg Hx      Social History     Social History     Marital status: Single     Spouse name: N/A     Number of children: 0     Years of education: N/A     Occupational History     retired      Social History Main Topics     Smoking status: Never Smoker     Smokeless tobacco: Not on file     Alcohol use 0.6 oz/week     1 Glasses of wine per week     Drug use: Not on file     Sexual activity: No     Other Topics Concern     Not on file     Social History Narrative       Review of Systems  General:  Denies problem  Eyes: Denies problem  Ears/Nose/Throat: Right ear discomfort as above  Cardiovascular: Denies problem  Respiratory:  Denies  "problem  Gastrointestinal:  Denies problem  Genitourinary: Denies problem  Musculoskeletal:  Denies problem  Skin: Denies problem  Neurologic: Difficulty sleeping as above  Psychiatric: Denies problem  Endocrine: Denies problem  Heme/Lymphatic: Denies problem   Allergic/Immunologic: Denies problem        Objective:         Vitals:    04/21/17 1122   BP: 118/76   Pulse: 68   Resp: 16   Weight: 101 lb 6.4 oz (46 kg)   Height: 5' 5.5\" (1.664 m)     Body mass index is 16.62 kg/(m^2).    Physical Exam:  General Appearance: Alert, cooperative, no distress, appears stated age  Head: Normocephalic, without obvious abnormality, atraumatic  Eyes: PERRL, conjunctiva/corneas clear, EOM's intact  Ears: Normal TM's and external ear canals, both ears  Nose: Nares normal, septum midline,mucosa normal, no drainage  Throat: Lips, mucosa, and tongue normal; teeth and gums normal  Neck: Supple, symmetrical, trachea midline, no adenopathy; thyroid: not enlarged, symmetric, no tenderness/mass/nodules; no carotid bruit or JVD  Back: Symmetric, no curvature, ROM normal, no CVA tenderness  Lungs: Clear to auscultation bilaterally, respirations unlabored  Breasts: No breast masses, tenderness, asymmetry, or nipple discharge of left breast, right breast postsurgically absent  Heart: Regular rate and rhythm, S1 and S2 normal, no murmur, rub, or gallop  Abdomen: Soft, non-tender, bowel sounds active all four quadrants, no masses, no organomegaly  Pelvic:Not examined  Extremities: Extremities normal, atraumatic, no cyanosis or edema  Skin: Skin color, texture, turgor normal, no rashes or lesions  Lymph nodes: Cervical, supraclavicular, and axillary nodes normal  Neurologic: Normal        "

## 2021-06-14 NOTE — PROGRESS NOTES
Assessment/Plan:       1. Syncope and collapse  Story is concerning for cardiogenic syncope without a prodrome consistent with vasovagal syncope.  No witnessed shaking consistent with seizure, no loss of bowel or bladder control.  Repeat ECG is within normal limits.  Will further work up hypokalemia and arrange for Echo.  Will also arrange follow-up with cardiology to decide on modality of ambulatory monitor to further work up her palpitations.  - Basic Metabolic Panel  - Echo Complete; Future  - Electrocardiogram Perform and Read    2. Hypokalemia  Unclear etiology.  Patient was not on a diuretic, is not a diabetic.  Urine testing not consistent with potassium wasting.  Recommended recheck again in 2 weeks.  Not currently taking a potassium supplement.  - Basic Metabolic Panel  - Potassium, Random Urine  - Creatinine, Random Urine    3. Palpitations  These don't sound particularly concerning, but are also not terribly frequent.  Will need a longer monitor than a 24-hour Holter, but these are very short-lived, so an event monitor that needs to be manually triggered would be problematic as well.  Will arrange follow-up with Cardiology to decide how best to proceed, and will arrange echocardiogram in the interim.  - Echo Complete; Future  - Electrocardiogram Perform and Read        Subjective:       Dorcas Roman is a 62 y.o. female who presents for hospital follow-up.  Patient was hospitalized at [facility] from 12/16-12/17/17 after a syncopal episode.  Patient states she was at a holiday party, and had had 2 glasses of wine.  She was talking to a friend, and the next thing she remembers, she was waking up on the floor and being tended to by EMTs.  She apparently fainted and remained unconscious for at least 4-5 minutes.  She fell from a standing height, and struck her head.  She required repair of a right brow laceration.  She had no witnessed shaking, did not lose bowel or bladder continence.  She has had  "vasovagal syncope in the past, once in [employer] when she says she was dehydrated, and has had near-syncope \"a few times\" where she feels a prodrome and can sit down to avoid losing consciousness.  These have not been associated with palpitations in the past.  Patient does complain of occasional palpitations that feel like an exaggerated beat followed by a few fast beats.  This lasts only a few seconds, and lately happens maybe once weekly.  These are not associated with lightheadedness or chest pain.  Patient also states that she has been sleeping well recently, but does not particularly wake feeling rested.  She gets 7-8 hours of sleep, wakes naturally.  She has had 2 sleep studies for insomnia, gave up alcohol and this helped.    In the hospital, patient was found to have a right brow laceration and a small subarachnoid hemorrhage.  She was seen by neurosurgery inpatient who recommended ruling out an aneurysmal source of the SAH.  Further workup of the syncope was deemed safe as an outpatient.    Labs Reviewed:  12/16/17: Na 142, K 2.8, Cl 115, CO2 17, AG 10, gluc 73, Ca 6.5, BUN 18, Cr 0.71, GFR >60  Trop I <0.01, TSH 0.81, EtOH 0.03, PT 13.0, INR 1.0  WBC 3.3, Hgb 11.9, Plt 164    Imaging Reviewed:  12/16/17: CTA head  Trace amount hyperdense subarachnoid hemorrhage in right sylvian fissure.  No aneurysm or high-flow vascular lesion.    Other:  EKG: sinus rhythm with 1st degree A-V block, suggestion of ventricular conduction delay, no significant change from 11/20/10  Procedure: 8 stitches with chromic gut to 3.5 cm laceration right temple    The following portions of the patient's history were reviewed and updated as appropriate: allergies, current medications, past medical history, past social history and problem list.      Current Outpatient Prescriptions:      CHOLECALCIFEROL, VITAMIN D3, (VITAMIN D3 ORAL), Take by mouth., Disp: , Rfl:      clobetasol (TEMOVATE) 0.05 % cream, Apply topically 2 " "(two) times a day., Disp: , Rfl:      DOCOSAHEXANOIC ACID/EPA (FISH OIL ORAL), 1 capsule daily. 1200 MG, Disp: , Rfl:      levothyroxine (SYNTHROID, LEVOTHROID) 75 MCG tablet, Take 1 tablet (75 mcg total) by mouth daily., Disp: 90 tablet, Rfl: 4     MAGNESIUM GLYCINATE ORAL, Take by mouth., Disp: , Rfl:      multivitamin therapeutic tablet, Take 1 tablet by mouth daily., Disp: , Rfl:     Review of Systems   A 12 point comprehensive review of systems was negative except as noted.      Objective:      /64 (Patient Site: Left Arm, Patient Position: Sitting, Cuff Size: Adult Regular)  Pulse 76  Resp 20  Ht 5' 5.5\" (1.664 m)  Wt 102 lb 11.2 oz (46.6 kg)  Breastfeeding? No  BMI 16.83 kg/m2    General appearance: alert, appears stated age and cooperative  Head: laceration right temple repaired, no drainage, no erythema  Eyes: conjunctivae clear, sclerae anicteric  Lungs: clear to auscultation bilaterally  Heart: regular rate and rhythm, S1, S2 normal, no murmur, click, rub or gallop  Extremities: extremities normal, atraumatic, no cyanosis or edema  Neurologic: Alert and oriented X 3, normal strength and tone. Normal symmetric reflexes. Normal coordination and gait        Results for orders placed or performed in visit on 12/20/17   Basic Metabolic Panel   Result Value Ref Range    Sodium 140 136 - 145 mmol/L    Potassium 4.1 3.5 - 5.0 mmol/L    Chloride 102 98 - 107 mmol/L    CO2 27 22 - 31 mmol/L    Anion Gap, Calculation 11 5 - 18 mmol/L    Glucose 79 70 - 125 mg/dL    Calcium 9.3 8.5 - 10.5 mg/dL    BUN 18 8 - 22 mg/dL    Creatinine 0.72 0.60 - 1.10 mg/dL    GFR MDRD Af Amer >60 >60 mL/min/1.73m2    GFR MDRD Non Af Amer >60 >60 mL/min/1.73m2   Potassium, Random Urine   Result Value Ref Range    Potassium, Urine 27.8 mmol/L   Creatinine, Random Urine   Result Value Ref Range    Creatinine, Urine 44.6 mg/dL   Electrocardiogram Perform and Read   Result Value Ref Range    SYSTOLIC BLOOD PRESSURE  mmHg    " DIASTOLIC BLOOD PRESSURE  mmHg    VENTRICULAR RATE 61 BPM    ATRIAL RATE 61 BPM    P-R INTERVAL 182 ms    QRS DURATION 86 ms    Q-T INTERVAL 422 ms    QTC CALCULATION (BEZET) 424 ms    P Axis 74 degrees    R AXIS 59 degrees    T AXIS 41 degrees    MUSE DIAGNOSIS       Normal sinus rhythm  Normal ECG  No previous ECGs available  Confirmed by DYLLAN BRIONES MD LOC: ([id number]) on 12/21/2017 3:18:28 PM

## 2021-06-18 NOTE — PROGRESS NOTES
Dorcsa Roman is a 62 y.o. female seen in consultation at the request of Dr. Alan for chronic ear ache. She gets this primarily at night when she is sleeping on her right side or when she wears headphones.  SHe has a history of TMD but does not currently use a mouth guard.  She denies hearing changes.  She denies tinnitus but notes one episode of syncope.  She denied any vertigo with this.      ALLERGY:    Allergies   Allergen Reactions     Neomycin Other (See Comments)     Did not tolerate tiffanie/poly/dex drops       MEDICATIONS:     Current Outpatient Prescriptions on File Prior to Visit   Medication Sig Dispense Refill     CHOLECALCIFEROL, VITAMIN D3, (VITAMIN D3 ORAL) Take 2,000 Units by mouth daily.        clobetasol (TEMOVATE) 0.05 % cream Apply topically 2 (two) times a week.        DOCOSAHEXANOIC ACID/EPA (FISH OIL ORAL) 1 capsule daily. 1200 MG       levothyroxine (SYNTHROID, LEVOTHROID) 75 MCG tablet Take 1 tablet (75 mcg total) by mouth daily. 90 tablet 3     MAGNESIUM GLYCINATE ORAL Take 1 tablet by mouth daily.        multivitamin therapeutic tablet Take 1 tablet by mouth daily.       No current facility-administered medications on file prior to visit.        Past Medical/Surgical History, Family History and Social History reviewed in detail and documented separately in the medical record.    Complete Review of Systems:  A 10-point review was performed.  Pertinent positives are noted in the HPI and on a separate scanned document in the chart.    Family History not pertinent to the to Chief Complaint or presenting problem    EXAM:  There were no vitals filed for this visit.    Nurse documentation reviewed  and documented separately.    General Appearance: Pleasant, alert, appropriate appearance for age. No acute distress    Head Exam: Normal. Normocephalic, atraumatic.    Eye Exam: Normal external eye, conjunctiva, lids, cornea. Extra-ocular movements are intact.    Left external ear: normal  Left  otoscopic exam: Normal EAC. Normal TM     Right external ear: normal  Right otoscopic exam: Normal EAC. Normal TM    Nose Exam: Normal external nose. Septum midline. Nasal mucosa normal.  Inferior turbinates normal.    OroPharynx Exam: Dental hygiene adequate. Normal tongue. Normal buccal mucosa. Normal palate.  Normal pharynx. Normal tonsils.    Neck Exam: Supple, no masses or nodes. Trachea and larynx midline.    Thyroid Exam: No tenderness, nodules or enlargement.    Salivary Glands: nontender without masses    Skin:  Intact    Neuro: Alert and oriented times 3, CN 2-12 grossly intact, no nystagmus, PERRL, EOMI, normal speech and gait    Chest/Respiratory Exam: Normal chest wall motion and respiratory effort. No audible stridor or wheezing.    Cardiovascular Exam: Regular rate and rhythm.  No cyanosis, clubbing or edema.    Pulses: carotid pulses normal    Mood:  Pleasant, cooperative    ASSESSMENT:  1. Otalgia, right        PLAN: Findings, assessment, and management options were discussed. Likely referred TMD pain and will get her re established with oral surgery. In regards to her syncope, reassured that this is not likely an peripheral vestibular issue and not related to the pain.  Happy to see back PRN.

## 2021-06-18 NOTE — PROGRESS NOTES
Assessment/Plan:     1. Routine general medical examination at a health care facility  Routine history and physical, updated in EMR.  Fasting labs updated as below.  Patient agrees to schedule another mammogram, card given.  She also agrees to update her bone density testing.  She will check on her insurance coverage for new shingles vaccine.  See below for other problem specific plans.    2. Hypothyroidism, unspecified type  Patient wishes to check more detailed levels.  She feels best when her TSH is between 1 and 2.  She has been taking her levothyroxine once daily for the past 4 weeks, prior to that took it 6 days per week for about 1 month.  - Thyroid Stimulating Hormone (TSH)  - T4, Free  - T3, Total    3. Hyperlipidemia, unspecified hyperlipidemia type  Fasting lipid profile updated today.  Patient is not taking a statin.  - Lipid [place] FASTING    4. Osteopenia  Does not feel that she gets enough calcium in her diet.  Discussed ways to optimize this.  She does take a daily vitamin D supplement.  Plan recheck bone density this year.  - Vitamin D, Total (25-Hydroxy)  - DXA Bone Density Scan; Future    5. Vitamin D deficiency  Taking 2000 units of vitamin D daily.  Recheck today.  - Vitamin D, Total (25-Hydroxy)    6. Screen for colon cancer  After discussion of risks versus benefits of multiple methods of screening, patient wishes to stick with fecal occult blood cards.  - Occult Blood(ICT)    7. Screening for diabetes mellitus  - Glucose    8. Screening for cervical cancer  - Gynecologic Cytology (PAP Smear)      Subjective:      Dorcas Roman is a 62 y.o. female who presents for an annual exam. The patient is not sexually active. The patient participates in regular exercise: yes. The patient reports that there is not domestic violence in her life.  Patient's only question today is that of her thyroid.  She feels that she is getting too much thyroid medication when her TSH is below 1.0.  On her own, she  decreased her dose to 75 mcg 6 days per week for 1 month after her last TSH check.  She is wondering if she should see an endocrinologist in this regard.  It sounds like she already has an appointment scheduled.  She wonders whether an anti-inflammatory diet or eating gluten-free would help stabilize her thyroid hormone level.    Healthy Habits:   Regular Exercise: Yes  Sunscreen Use: Yes - on face  Healthy Diet: Yes  Dental Visits Regularly: Yes  Seat Belt: Yes  Sexually active: No  Self Breast Exam Monthly:Yes  Hemoccults: Yes - 17 - cards given today  Lipid Profile: Yes  Glucose Screen: Yes  Prevention of Osteoporosis: Yes  Last Dexa: Yes - 2015, osteopenia  Guns at Home:  No      Immunization History   Administered Date(s) Administered     DTaP, historic 2006, 2006, 10/27/2006, 2007, 2011     Hep A, historic 2007, 2007     Hep B, Peds, Historic 2006, 2006, 10/27/2006     HiB, historic,unspecified 2006, 2006, 2007     IPV 1992     Influenza H1v7-43, 2010     Influenza, inj, historic,unspecified 2009, 10/23/2010, 10/15/2011     Influenza, seasonal,quad inj 36+ mos 2015, 10/11/2017     Influenza, seasonal,quad inj 6-35 mos 10/01/2012, 10/01/2014     Influenza,seasonal quad, PF 10/28/2013     Tdap 2012     ZOSTER, LIVE 2013     Immunization status: missing doses of Shingrix, patient to check with insurance.    No exam data present    Gynecologic History  No LMP recorded. Patient is not currently having periods (Reason: Chemotherapy/radiation).  Contraception: abstinence  Last Pap: 4/3/14. Results were: normal and positive for low-risk HPV  Last mammogram: 15. Results were: normal      OB History    Para Term  AB Living   0 0 0      SAB TAB Ectopic Multiple Live Births                    Current Outpatient Prescriptions   Medication Sig Dispense Refill     CHOLECALCIFEROL, VITAMIN D3, (VITAMIN  "D3 ORAL) Take 2,000 Units by mouth daily.        clobetasol (TEMOVATE) 0.05 % cream Apply topically 2 (two) times a week.        DOCOSAHEXANOIC ACID/EPA (FISH OIL ORAL) 1 capsule daily. 1200 MG       levothyroxine (SYNTHROID, LEVOTHROID) 75 MCG tablet Take 1 tablet (75 mcg total) by mouth daily. 90 tablet 3     MAGNESIUM GLYCINATE ORAL Take 1 tablet by mouth daily.        clobetasol (TEMOVATE) 0.05 % cream APPLY TO AREA AS NEEDED. 30 g 1     multivitamin therapeutic tablet Take 1 tablet by mouth daily.       No current facility-administered medications for this visit.      Past Medical History:   Diagnosis Date     Breast cancer     right     Breast cyst      Concussion 1970    struck her head while tobogganing     Herpes Simplex Type II      Hx antineoplastic chemotherapy     possibly adriamycin and Taxol     Hx of radiation therapy      Inverted nipple      Postmenopausal      Vasovagal syncope 1965    fainted while shopping with father who \"forgot to feed her\"     Past Surgical History:   Procedure Laterality Date     BREAST BIOPSY Right 2000     MASTECTOMY Right 2000     Neomycin  Family History   Problem Relation Age of Onset     COPD Mother      smoker     Heart failure Mother      Diabetes type II Mother      Sudden death Mother 81      alone at home, no autopsy     Depression Mother      Alcohol abuse Father      Lung cancer Father 68     Esophageal cancer Father      Pulmonary embolism Father 68     fatal event     Breast cancer Sister 52     cause of death     Bipolar disorder Sister      Breast cancer Maternal Grandmother      Bipolar disorder Brother      Alcoholism Brother      and a smoker     Alcoholism Sister      and a smoker     Social History     Social History     Marital status: Single     Spouse name: N/A     Number of children: 0     Years of education: N/A     Occupational History     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       Review of Systems  General:  Feels cold much of the time  Eyes: Denies problem  Ears/Nose/Throat: Denies problem  Cardiovascular: Denies problem  Respiratory:  Denies problem  Gastrointestinal:  Denies problem  Genitourinary: Denies problem  Musculoskeletal:  Denies problem  Skin: Denies problem  Neurologic: Denies problem  Psychiatric: Denies problem  Endocrine: Denies problem  Heme/Lymphatic: Denies problem   Allergic/Immunologic: Denies problem        Objective:         Vitals:    06/04/18 1325   BP: 112/68   Pulse: 64   Resp: 16   Weight: 105 lb 9.6 oz (47.9 kg)   Height: 5' 5.4\" (1.661 m)     Body mass index is 17.36 kg/(m^2).    Physical Exam:  General Appearance: Alert, cooperative, no distress, appears stated age  Head: Normocephalic, without obvious abnormality, atraumatic  Eyes: PERRL, conjunctiva/corneas clear, EOM's intact  Ears: Normal TM's and external ear canals, both ears  Nose: Nares normal, septum midline, mucosa normal, no drainage  Throat: Lips, mucosa, and tongue normal; teeth and gums normal  Neck: Supple, symmetrical, trachea midline, no adenopathy; thyroid: not enlarged, symmetric, no tenderness/mass/nodules  Back: Symmetric, no curvature, ROM normal, no CVA tenderness  Lungs: Clear to auscultation bilaterally, respirations unlabored  Breasts: No breast masses, tenderness, asymmetry, or nipple discharge left breast, right breast postsurgically absent  Heart: Regular rate and rhythm, S1 and S2 normal, no murmur, rub, or gallop  Abdomen: Soft, non-tender, bowel sounds active " all four quadrants, no masses, no organomegaly  Pelvic: Normally developed genitalia with no external lesions or eruptions. Vagina and cervix show no lesions, inflammation, discharge or tenderness. No cystocele, No rectocele. Uterus normal to palpation.  No adnexal mass or tenderness.  Extremities: Extremities normal, atraumatic, no cyanosis or edema  Skin: Skin color, texture, turgor normal, no rashes or lesions  Lymph nodes: Cervical, supraclavicular, and axillary nodes normal  Neurologic: Normal

## 2021-06-18 NOTE — PROGRESS NOTES
Audiology Report    Referring Provider:   Service    History:  Dorcas Roman is seen in conjunction with ENT appointment today. She has a history of otalgia in her right ear for the past 4 years. She states she constantly experiences a dull ear ache in the right ear. Dorcas reports that she experiences more severe ear pain in the right ear if she sleeps on that ear or when she wears ear muffs. 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 She denies a history of hearing concerns, otorrhea, tinnitus, dizziness, aural fullness, ear surgery, noise exposure, and family history of hearing loss.     Results:     Left Ear Right Ear   Otoscopy clear canals clear canals   Pure Tone Audiometry normal hearing normal hearing with mild sensorineural hearing loss at 8000 Hz   Word Recognition excellent excellent   Tympanometry shallow (Type As)  normal (Type A)     Transducer: Circumaural headphones    Reliability was good  and there was good  SRT to PTA agreement.       Plan:  The patient is returned to ENT for follow up.  She should return for retesting with ENT recommendation.      Please see audiogram under  media  and  audiogram  in the patient s chart.     Alex Ascencio, CCC-A  [place] Licensed Audiologist #[id number]

## 2021-06-20 NOTE — PROGRESS NOTES
Assessment / Impression     1. Hordeolum externum (stye)     2. Need for vaccination  Pneumococcal conjugate vaccine 13-valent 6wks-17yrs; >50yrs    Influenza High Dose, Seasonal 65+ yrs   3. Blepharitis of both eyes  Ambulatory referral to Ophthalmology   4. Right tennis elbow         Plan:     1.  Given the recurrence of her symptoms will refer to ophthalmology for further evaluation treatment.  We discussed warm compresses to treat her current stye.  3.  Ambulatory referral for ophthalmology for evaluation.  4.  Discussed common nature of this condition.  Given the mild symptoms the patient is going to continue try to rehab it on herself.  If she would like referral to physical therapy she will alert us.    Subjective:      HPI: Dorcas Roman is a 63 y.o. female with multiple questions comes in today for evaluation.  First of all patient reports approximately 1-1/2 months ago she has developed recurrent styes.  She reports she has had 2 in the right eye and one in the left eye at all resolve on their own with warm compresses but she is concerned about the chronicity of them.  She does not previously have a history of them.  She does however have a diagnosis of chronic blepharitis for which she uses warm compresses to both eyes twice a day.  She is not under the care of an ophthalmologist at this time.  She denies any vision changes or pain.  She however describes it is slightly uncomfortable.  In addition she reports some right elbow pain.  She reports she was playing a lot of tennis a year ago and experienced some pain in her right elbow that caused her to have to stop playing tennis.  She states that symptoms significantly improved over the past year but that it still will not allow her to play tennis.  She is doing yoga and states that occasionally is bothersome.  She denies any loss of  strength or any previous known trauma to the elbow.  She has no other questions or concerns.      Review of  "Systems  Pertinent items are noted in HPI.       Objective:     /64 (Patient Site: Left Arm, Patient Position: Sitting, Cuff Size: Adult Regular)  Pulse 66  Resp 16  Ht 5' 5.4\" (1.661 m)  Wt 107 lb 1.6 oz (48.6 kg)  BMI 17.61 kg/m2  Physical Examination: General appearance - alert, well appearing, and in no distress  Eye: Small pustule on her lower lid with mild erythema surrounding.  No lid swelling.  Vision is grossly intact.  Extraocular muscles are intact.  Left eye is within normal limits.  Right Elbow: Patient points to discomfort in the area of her extensor tendon insertion on the lateral epicondyle.  She is no obvious swelling or pain with palpation today.  5 out of 5  strength and sensation is grossly intact.  No obvious bony abnormalities appreciated today.  "

## 2021-06-22 NOTE — TELEPHONE ENCOUNTER
Orders being requested: TSH   Reason service is needed/diagnosis: 6 month lab recheck   When are orders needed by: now   Where to send Orders: [facility]  Okay to leave detailed message?  Yes

## 2021-06-26 NOTE — PROGRESS NOTES
"Progress Notes by Humberto Rawls MD at 1/8/2018  9:10 AM     Author: Humberto Rawls MD Service: -- Author Type: Physician    Filed: 1/8/2018 10:19 AM Encounter Date: 1/8/2018 Status: Signed    : Humberto Rawls MD (Physician)           Click to link to [facility] Consultation Note    Thank you, Dr. Priya Alan, for asking Dorcas Roman to meet with me in consultation today at the [facility] to evaluate loss of consciousness.     Assessment:    1. Syncope  JAMI Hook-Up   2. Concussion         Discussion:    Dorcas has had 3 episodes of loss of consciousness in her life.  The first occurred at age 10 while she was shopping with her father and she states \"he forgot to feed us\".  She felt unwell before that episode and it seems to be typical of neurocardiogenic syncope.  The second occurred at age 15 when she had a concussion as a result of striking her head while tobogganing.  The third episode is the episode in question and will be described below.  The cause of this episode is not clear, she was monitored overnight at [facility], but was most likely a syncopal event.  She now has symptoms that could be the sequela of a concussion associated with the collapse and head injury.    Plan:    1.  Two-week ACT monitor.  2.  Consultation with electrophysiology to consider the merits of tilt table testing.    An After Visit Summary was printed and given to the patient.    Current History:    I met with Dorcas in consultation this morning at the request of her family physician.  She was at a holiday party for volunteers in December.  She had some tea before she went to the party and states she had eaten significant amounts of food at the party prior to drinking 1 glass of wine.  She was standing in the kitchen talking with friends and the next thing she remembers she was lying on the floor with people looking down at her.  1 of her friends at the " "party was a physician and states she collapsed suddenly without apparent warning.  She struck her head hard enough to cause a laceration above her right eyebrow.  She was taken to [facility], evaluated in the emergency room, and admitted overnight observation.  She did not see a cardiologist in consultation at that facility.    Since the event she still has some headache and has increased daytime sleepiness taking 1 or 2 short naps each day.  She has been sleeping 8-9 hours a night.    Patient Active Problem List   Diagnosis   ? Postmenopausal atrophic vaginitis   ? Lichen Sclerosus Et Atrophicus   ? Osteopenia   ? Hypothyroidism   ? Hyperlipidemia   ? Breast Cancer       Past Medical History:  Past Medical History:   Diagnosis Date   ? Breast cancer 2000    right   ? Breast cyst    ? Concussion 1970    struck her head while tobogganing   ? Herpes Simplex Type II    ? Hx antineoplastic chemotherapy     possibly adriamycin and Taxol   ? Hx of radiation therapy    ? Inverted nipple    ? Postmenopausal    ? Vasovagal syncope 1965    fainted while shopping with father who \"forgot to feed her\"       Past Surgical History:  Past Surgical History:   Procedure Laterality Date   ? BREAST BIOPSY Right 2000   ? MASTECTOMY Right        Family History:  Family History   Problem Relation Age of Onset   ? COPD Mother      smoker   ? Heart failure Mother    ? Diabetes type II Mother    ? Sudden death Mother 81      alone at home, no autopsy   ? Depression Mother    ? Alcohol abuse Father    ? Lung cancer Father 68   ? Esophageal cancer Father    ? Pulmonary embolism Father 68     fatal event   ? Breast cancer Sister 52     cause of death   ? Bipolar disorder Sister    ? Breast cancer Maternal Grandmother    ? Bipolar disorder Brother    ? Alcoholism Brother      and a smoker   ? Alcoholism Sister      and a smoker       Social History:   reports that she has never smoked. She has never used smokeless tobacco. She " "reports that she drinks about 1.8 - 3.6 oz of alcohol per week  She reports that she does not use illicit drugs.    Medications:  Outpatient Encounter Prescriptions as of 1/8/2018   Medication Sig Dispense Refill   ? CHOLECALCIFEROL, VITAMIN D3, (VITAMIN D3 ORAL) Take 2,000 Units by mouth daily.      ? clobetasol (TEMOVATE) 0.05 % cream Apply topically 2 (two) times a day.     ? DOCOSAHEXANOIC ACID/EPA (FISH OIL ORAL) 1 capsule daily. 1200 MG     ? levothyroxine (SYNTHROID, LEVOTHROID) 75 MCG tablet Take 1 tablet (75 mcg total) by mouth daily. 30 tablet 0   ? MAGNESIUM GLYCINATE ORAL Take 1 tablet by mouth daily.      ? multivitamin therapeutic tablet Take 1 tablet by mouth daily.     ? clobetasol (TEMOVATE) 0.05 % cream APPLY TO AREA AS NEEDED. 30 g 1     No facility-administered encounter medications on file as of 1/8/2018.        Allergies:  Neomycin    Review of Systems:     General: WNL  Eyes: WNL  Ears/Nose/Throat: WNL  Lungs: WNL  Heart: Fainting  Stomach: WNL  Bladder: WNL  Muscle/Joints: WNL  Skin: WNL  Nervous System: WNL  Mental Health: WNL     Blood: WNL    Objective:     Physical Exam:  Wt Readings from Last 5 Encounters:   01/08/18 105 lb (47.6 kg)   12/28/17 102 lb (46.3 kg)   12/20/17 102 lb 11.2 oz (46.6 kg)   04/21/17 101 lb 6.4 oz (46 kg)   06/30/16 102 lb 6.4 oz (46.4 kg)      5' 5.5\" (1.664 m)  Body mass index is 17.21 kg/(m^2).  /72 (Patient Site: Left Arm, Patient Position: Sitting, Cuff Size: Adult Small)  Pulse 68  Resp 16  Ht 5' 5.5\" (1.664 m)  Wt 105 lb (47.6 kg)  BMI 17.21 kg/m2    General Appearance: Alert and not in distress   HEENT: No scleral icterus; the tongue is midline and the mucous membranes are pink and moist   Neck: No cervical bruits, adenopathy, or thyromegaly; jugular venous pressure is less than 5 cm   Chest: The spine is straight and the chest is symmetric   Lungs: Respirations are unlabored; the lungs are clear to auscultation   Cardiovasular: Auscultation " reveals normal first and second heart sounds with no murmurs, rubs, or gallops; the carotid, radial, femoral, and posterior tibial pulses are intact   Abdomen: No organomegaly, masses, bruits, or tenderness; bowel sounds are present   Extremities: No cyanosis, clubbing or edema   Skin: No xanthelasma   Neurologic: Mood and affect are appropriate       Cardiac testing:    EKG: Sinus rhythm, normal EKG per my personal review.    Echocardiogram:      No previous study for comparison.    Sinus bradycardia. Heart rate in the 50s during the examination.    Left ventricle ejection fraction is normal. Left ventricular ejection fraction estimated 55-60%.    The right ventricle is not optimally visualized. Right ventricular function appears grossly preserved.    Mild mitral regurgitation.    Imaging:    No results found.    Lab Review:    Lab Results   Component Value Date     01/02/2018    K 4.0 01/02/2018     01/02/2018    CO2 30 01/02/2018    BUN 17 01/02/2018    CREATININE 0.75 01/02/2018    CALCIUM 9.9 01/02/2018     Lab Results   Component Value Date    WBC 4.7 06/29/2015    HGB 14.9 04/21/2017    HCT 44.0 06/29/2015    MCV 95 06/29/2015     06/29/2015     Lab Results   Component Value Date    CHOL 266 (H) 04/21/2017    TRIG 91 04/21/2017    HDL 71 04/21/2017     Creatinine (mg/dL)   Date Value   01/02/2018 0.75   12/20/2017 0.72   04/21/2017 0.74   06/29/2015 0.80     LDL Calculated (mg/dL)   Date Value   04/21/2017 177 (H)   04/06/2015 204 (H)   04/03/2014 188 (H)           Much or all of the text in this note was generated through the use of the Dragon Dictate voice-to-text software. Errors in spelling or words which seem out of context are unintentional. Sound alike errors, in particular, may have escaped editing.

## 2021-07-03 NOTE — ADDENDUM NOTE
Addendum Note by Radha Conner LPN at 1/29/2018  9:38 AM     Author: Radha Conner LPN Service: -- Author Type: Licensed Nurse    Filed: 1/29/2018  9:38 AM Encounter Date: 1/26/2018 Status: Signed    : Radha Conner LPN (Licensed Nurse)    Addended by: RADHA CONNER on: 1/29/2018 09:38 AM        Modules accepted: Orders

## 2021-07-28 ENCOUNTER — TRANSFERRED RECORDS (OUTPATIENT)
Dept: HEALTH INFORMATION MANAGEMENT | Facility: CLINIC | Age: 66
End: 2021-07-28

## 2021-09-04 ENCOUNTER — HEALTH MAINTENANCE LETTER (OUTPATIENT)
Age: 66
End: 2021-09-04

## 2022-02-19 ENCOUNTER — HEALTH MAINTENANCE LETTER (OUTPATIENT)
Age: 67
End: 2022-02-19

## 2022-04-19 ASSESSMENT — ENCOUNTER SYMPTOMS
EYE IRRITATION: 1
DECREASED LIBIDO: 0
EYE REDNESS: 1
HOT FLASHES: 1
EYE PAIN: 1

## 2022-04-19 ASSESSMENT — ANXIETY QUESTIONNAIRES
4. TROUBLE RELAXING: NOT AT ALL
2. NOT BEING ABLE TO STOP OR CONTROL WORRYING: NOT AT ALL
3. WORRYING TOO MUCH ABOUT DIFFERENT THINGS: NOT AT ALL
5. BEING SO RESTLESS THAT IT IS HARD TO SIT STILL: NOT AT ALL
7. FEELING AFRAID AS IF SOMETHING AWFUL MIGHT HAPPEN: NOT AT ALL
GAD7 TOTAL SCORE: 0
GAD7 TOTAL SCORE: 0
7. FEELING AFRAID AS IF SOMETHING AWFUL MIGHT HAPPEN: NOT AT ALL
1. FEELING NERVOUS, ANXIOUS, OR ON EDGE: NOT AT ALL
6. BECOMING EASILY ANNOYED OR IRRITABLE: NOT AT ALL

## 2022-04-20 ASSESSMENT — ANXIETY QUESTIONNAIRES: GAD7 TOTAL SCORE: 0

## 2022-04-22 ENCOUNTER — OFFICE VISIT (OUTPATIENT)
Dept: OBGYN | Facility: CLINIC | Age: 67
End: 2022-04-22
Attending: OBSTETRICS & GYNECOLOGY
Payer: COMMERCIAL

## 2022-04-22 VITALS
DIASTOLIC BLOOD PRESSURE: 84 MMHG | SYSTOLIC BLOOD PRESSURE: 136 MMHG | BODY MASS INDEX: 20.94 KG/M2 | WEIGHT: 110.9 LBS | HEART RATE: 70 BPM | HEIGHT: 61 IN

## 2022-04-22 DIAGNOSIS — N90.5 VULVAR ATROPHY: ICD-10-CM

## 2022-04-22 DIAGNOSIS — B97.7 HIGH RISK HUMAN PAPILLOMA VIRUS (HPV) INFECTION OF CERVIX: ICD-10-CM

## 2022-04-22 DIAGNOSIS — Z12.4 ENCOUNTER FOR SCREENING FOR MALIGNANT NEOPLASM OF CERVIX: ICD-10-CM

## 2022-04-22 DIAGNOSIS — N95.2 VAGINAL ATROPHY: Primary | ICD-10-CM

## 2022-04-22 DIAGNOSIS — N72 HIGH RISK HUMAN PAPILLOMA VIRUS (HPV) INFECTION OF CERVIX: ICD-10-CM

## 2022-04-22 PROCEDURE — G0463 HOSPITAL OUTPT CLINIC VISIT: HCPCS

## 2022-04-22 PROCEDURE — G0123 SCREEN CERV/VAG THIN LAYER: HCPCS | Performed by: OBSTETRICS & GYNECOLOGY

## 2022-04-22 PROCEDURE — 87624 HPV HI-RISK TYP POOLED RSLT: CPT | Performed by: OBSTETRICS & GYNECOLOGY

## 2022-04-22 PROCEDURE — 99203 OFFICE O/P NEW LOW 30 MIN: CPT | Performed by: OBSTETRICS & GYNECOLOGY

## 2022-04-22 RX ORDER — ROSUVASTATIN CALCIUM 10 MG/1
TABLET, COATED ORAL
COMMUNITY
Start: 2022-03-27

## 2022-04-22 NOTE — LETTER
"4/22/2022       RE: Dorcas Roman  [address]  [facility] [address]     Dear Colleague,    Thank you for referring your patient, Dorcas Roman, to the [facility] [place] [facility]. Please see a copy of my visit note below.    67 yo postmenopausal woman with history of ER+ breast cancer and history of lichen sclerosus returns (last seen by me in 2011) for follow-up.  She has pain with intercourse, but not currently an issue.  She is has used clobetasol in the past and this was helpful, but not using now.  She is not interested in vaginal estrogen due to her breast CA history.  She has used it in the remote past.    She is unsure if she needs additional pap smears.  Her last pap in 2019 was normal; however the one previous to this in 2018 was NIL/positive HPV other HR.    Past Medical History:   Diagnosis Date     Abnormal Pap smear     Along time ago. Been fine since.     Abnormal Pap smear of cervix     hpv but ok last time     Breast disorder 11/01/2000    Cancer     Depressive disorder      Fibroid      Genital herpes      Heart disease atherosclerosis     History of breast cancer 01/01/2000    ER +     History of human papillomavirus infection      HSV infection      Hypothyroid      Wounds and injuries      Past Surgical History:   Procedure Laterality Date     BIOPSY BREAST Right 2000     BREAST SURGERY  Nov 2000    Mastectomy     COLONOSCOPY  2003     mastectomy      right     MASTECTOMY Right 2000     Physical exam:  /84 (BP Location: Left arm, Patient Position: Chair)   Pulse 70   Ht 1.549 m (5' 1\")   Wt 50.3 kg (110 lb 14.4 oz)   BMI 20.95 kg/m    Gen'l: appears well, no distress  Abd: soft, NT, ND  Vulva: atrophic changes, normal architecture, no fissures, no lesions  Urethra: normal  Vagina: pink, atrophic, phsyiologic discharge present  Cervix: small atrophic  Uterus: mobile, NT, small  Adnexa: no " palpable masses, NT  Rectum: Anus normal, no rectovaginal exam done    Assessment/Plan:  Vaginal/Vulvar estrogen loss atrophy- declines treatment.=  History of HPV positive pap, requires ongoing screening    - pap sent today, f/u according to result  - reviewed exam findings, currently no vulvar issues; however, if desires to treat is a candidate for topical vaginal estrogen and may use clobetasol prn for fissures.    Lashonda Kenyon MD

## 2022-04-26 LAB
BKR LAB AP GYN ADEQUACY: NORMAL
BKR LAB AP GYN INTERPRETATION: NORMAL
BKR LAB AP HPV REFLEX: NORMAL
BKR LAB AP PREVIOUS ABNL DX: NORMAL
BKR LAB AP PREVIOUS ABNORMAL: NORMAL
PATH REPORT.COMMENTS IMP SPEC: NORMAL
PATH REPORT.COMMENTS IMP SPEC: NORMAL
PATH REPORT.RELEVANT HX SPEC: NORMAL

## 2022-04-28 LAB
HUMAN PAPILLOMA VIRUS 16 DNA: NEGATIVE
HUMAN PAPILLOMA VIRUS 18 DNA: NEGATIVE
HUMAN PAPILLOMA VIRUS FINAL DIAGNOSIS: NORMAL
HUMAN PAPILLOMA VIRUS OTHER HR: NEGATIVE

## 2022-05-01 NOTE — PROGRESS NOTES
"65 yo postmenopausal woman with history of ER+ breast cancer and history of lichen sclerosus returns (last seen by me in 2011) for follow-up.  She has pain with intercourse, but not currently an issue.  She is has used clobetasol in the past and this was helpful, but not using now.  She is not interested in vaginal estrogen due to her breast CA history.  She has used it in the remote past.    She is unsure if she needs additional pap smears.  Her last pap in 2019 was normal; however the one previous to this in 2018 was NIL/positive HPV other HR.    Past Medical History:   Diagnosis Date     Abnormal Pap smear     Along time ago. Been fine since.     Abnormal Pap smear of cervix     hpv but ok last time     Breast disorder 11/01/2000    Cancer     Depressive disorder      Fibroid      Genital herpes      Heart disease atherosclerosis     History of breast cancer 01/01/2000    ER +     History of human papillomavirus infection      HSV infection      Hypothyroid      Wounds and injuries      Past Surgical History:   Procedure Laterality Date     BIOPSY BREAST Right 2000     BREAST SURGERY  Nov 2000    Mastectomy     COLONOSCOPY  2003     mastectomy      right     MASTECTOMY Right 2000     Physical exam:  /84 (BP Location: Left arm, Patient Position: Chair)   Pulse 70   Ht 1.549 m (5' 1\")   Wt 50.3 kg (110 lb 14.4 oz)   BMI 20.95 kg/m    Gen'l: appears well, no distress  Abd: soft, NT, ND  Vulva: atrophic changes, normal architecture, no fissures, no lesions  Urethra: normal  Vagina: pink, atrophic, phsyiologic discharge present  Cervix: small atrophic  Uterus: mobile, NT, small  Adnexa: no palpable masses, NT  Rectum: Anus normal, no rectovaginal exam done    Assessment/Plan:  Vaginal/Vulvar estrogen loss atrophy- declines treatment.=  History of HPV positive pap, requires ongoing screening    - pap sent today, f/u according to result  - reviewed exam findings, currently no vulvar issues; however, if desires " to treat is a candidate for topical vaginal estrogen and may use clobetasol prn for fissures.    Lashonda Kenyon MD

## 2022-05-02 ENCOUNTER — MYC MEDICAL ADVICE (OUTPATIENT)
Dept: OBGYN | Facility: CLINIC | Age: 67
End: 2022-05-02
Payer: COMMERCIAL

## 2022-05-02 DIAGNOSIS — N95.2 VAGINAL ATROPHY: Primary | ICD-10-CM

## 2022-05-13 RX ORDER — ESTRADIOL 0.1 MG/G
2 CREAM VAGINAL
Qty: 45 G | Refills: 4 | Status: SHIPPED | OUTPATIENT
Start: 2022-05-16 | End: 2023-03-07

## 2022-10-10 ENCOUNTER — ANCILLARY PROCEDURE (OUTPATIENT)
Dept: BONE DENSITY | Facility: CLINIC | Age: 67
End: 2022-10-10
Attending: FAMILY MEDICINE
Payer: COMMERCIAL

## 2022-10-10 DIAGNOSIS — Z78.0 POSTMENOPAUSAL: ICD-10-CM

## 2022-10-10 DIAGNOSIS — M85.80 OSTEOPENIA, UNSPECIFIED LOCATION: ICD-10-CM

## 2022-10-10 PROCEDURE — 77080 DXA BONE DENSITY AXIAL: CPT | Performed by: INTERNAL MEDICINE

## 2022-10-18 ENCOUNTER — ANCILLARY PROCEDURE (OUTPATIENT)
Dept: MAMMOGRAPHY | Facility: HOSPITAL | Age: 67
End: 2022-10-18
Attending: FAMILY MEDICINE
Payer: COMMERCIAL

## 2022-10-18 DIAGNOSIS — Z12.31 VISIT FOR SCREENING MAMMOGRAM: ICD-10-CM

## 2022-10-18 PROCEDURE — 77067 SCR MAMMO BI INCL CAD: CPT | Mod: 52

## 2022-10-22 ENCOUNTER — HEALTH MAINTENANCE LETTER (OUTPATIENT)
Age: 67
End: 2022-10-22

## 2023-03-07 DIAGNOSIS — N95.2 VAGINAL ATROPHY: ICD-10-CM

## 2023-03-07 RX ORDER — ESTRADIOL 0.1 MG/G
2 CREAM VAGINAL
Qty: 45 G | Refills: 0 | Status: SHIPPED | OUTPATIENT
Start: 2023-03-09 | End: 2023-03-07

## 2023-03-07 NOTE — TELEPHONE ENCOUNTER
Received refill request for estrace vaginal cream.  Last in clinic 4/2022.  Refilled and message sent to schedule follow up

## 2023-03-07 NOTE — TELEPHONE ENCOUNTER
Patient called.  Had received notification via [employer] that her estrace cream had been refilled.  Patient does NOT want this medication and does NOT want to be charged for it, so has requested that we cancel this prescription.      Called [facility] home delivery and confirmed cancelled prescription.

## 2023-04-01 ENCOUNTER — HEALTH MAINTENANCE LETTER (OUTPATIENT)
Age: 68
End: 2023-04-01

## 2023-10-23 ENCOUNTER — ANCILLARY PROCEDURE (OUTPATIENT)
Dept: MAMMOGRAPHY | Facility: HOSPITAL | Age: 68
End: 2023-10-23
Attending: FAMILY MEDICINE
Payer: COMMERCIAL

## 2023-10-23 DIAGNOSIS — Z12.31 VISIT FOR SCREENING MAMMOGRAM: ICD-10-CM

## 2023-10-23 PROCEDURE — 77067 SCR MAMMO BI INCL CAD: CPT | Mod: 52

## 2024-06-02 ENCOUNTER — HEALTH MAINTENANCE LETTER (OUTPATIENT)
Age: 69
End: 2024-06-02

## 2024-10-24 ENCOUNTER — ANCILLARY PROCEDURE (OUTPATIENT)
Dept: MAMMOGRAPHY | Facility: CLINIC | Age: 69
End: 2024-10-24
Attending: FAMILY MEDICINE
Payer: COMMERCIAL

## 2024-10-24 ENCOUNTER — ANCILLARY PROCEDURE (OUTPATIENT)
Dept: BONE DENSITY | Facility: CLINIC | Age: 69
End: 2024-10-24
Attending: FAMILY MEDICINE
Payer: COMMERCIAL

## 2024-10-24 DIAGNOSIS — Z12.31 VISIT FOR SCREENING MAMMOGRAM: ICD-10-CM

## 2024-10-24 DIAGNOSIS — M85.80 OSTEOPENIA, UNSPECIFIED LOCATION: ICD-10-CM

## 2024-10-24 PROCEDURE — 77080 DXA BONE DENSITY AXIAL: CPT | Mod: TC | Performed by: RADIOLOGY

## 2024-10-24 PROCEDURE — 77063 BREAST TOMOSYNTHESIS BI: CPT | Mod: TC | Performed by: STUDENT IN AN ORGANIZED HEALTH CARE EDUCATION/TRAINING PROGRAM

## 2024-10-24 PROCEDURE — 77091 TBS TECHL CALCULATION ONLY: CPT | Performed by: RADIOLOGY

## 2024-10-24 PROCEDURE — 77067 SCR MAMMO BI INCL CAD: CPT | Mod: TC | Performed by: STUDENT IN AN ORGANIZED HEALTH CARE EDUCATION/TRAINING PROGRAM

## 2025-06-15 ENCOUNTER — HEALTH MAINTENANCE LETTER (OUTPATIENT)
Age: 70
End: 2025-06-15